# Patient Record
Sex: FEMALE | Race: WHITE | NOT HISPANIC OR LATINO | Employment: FULL TIME | URBAN - METROPOLITAN AREA
[De-identification: names, ages, dates, MRNs, and addresses within clinical notes are randomized per-mention and may not be internally consistent; named-entity substitution may affect disease eponyms.]

---

## 2020-06-30 ENCOUNTER — TELEPHONE (OUTPATIENT)
Dept: INTERNAL MEDICINE CLINIC | Facility: CLINIC | Age: 25
End: 2020-06-30

## 2020-07-01 ENCOUNTER — OFFICE VISIT (OUTPATIENT)
Dept: INTERNAL MEDICINE CLINIC | Facility: CLINIC | Age: 25
End: 2020-07-01
Payer: COMMERCIAL

## 2020-07-01 VITALS
HEIGHT: 64 IN | TEMPERATURE: 99.4 F | RESPIRATION RATE: 12 BRPM | HEART RATE: 64 BPM | DIASTOLIC BLOOD PRESSURE: 58 MMHG | SYSTOLIC BLOOD PRESSURE: 96 MMHG | BODY MASS INDEX: 21.07 KG/M2 | WEIGHT: 123.4 LBS

## 2020-07-01 DIAGNOSIS — Z3A.11 11 WEEKS GESTATION OF PREGNANCY: ICD-10-CM

## 2020-07-01 DIAGNOSIS — Z00.00 ROUTINE PHYSICAL EXAMINATION: Primary | ICD-10-CM

## 2020-07-01 DIAGNOSIS — Z00.00 ANNUAL PHYSICAL EXAM: ICD-10-CM

## 2020-07-01 PROCEDURE — 3008F BODY MASS INDEX DOCD: CPT | Performed by: NURSE PRACTITIONER

## 2020-07-01 PROCEDURE — 1036F TOBACCO NON-USER: CPT | Performed by: NURSE PRACTITIONER

## 2020-07-01 PROCEDURE — 99385 PREV VISIT NEW AGE 18-39: CPT | Performed by: NURSE PRACTITIONER

## 2020-07-01 RX ORDER — SWAB
1 SWAB, NON-MEDICATED MISCELLANEOUS DAILY
COMMUNITY
End: 2021-02-10

## 2020-07-01 NOTE — PROGRESS NOTES
ADULT ANNUAL PHYSICAL  [address] [facility]    NAME: Jayjay Anne  AGE: 25 y o  SEX: female  : 1995     DATE: 2020     Assessment and Plan:     Problem List Items Addressed This Visit        Other    11 weeks gestation of pregnancy    Relevant Orders    Ambulatory referral to Obstetrics / Gynecology      Other Visit Diagnoses     Routine physical examination    -  Primary          Immunizations and preventive care screenings were discussed with patient today  Appropriate education was printed on patient's after visit summary  Counseling:  Alcohol/drug use: discussed moderation in alcohol intake, the recommendations for healthy alcohol use, and avoidance of illicit drug use  Dental Health: discussed importance of regular tooth brushing, flossing, and dental visits  Injury prevention: discussed safety/seat belts, safety helmets, smoke detectors, carbon dioxide detectors, and smoking near bedding or upholstery  Sexual health: discussed sexually transmitted diseases, partner selection, use of condoms, avoidance of unintended pregnancy, and contraceptive alternatives  · Exercise: the importance of regular exercise/physical activity was discussed  Recommend exercise 3-5 times per week for at least 30 minutes  No follow-ups on file  Chief Complaint:     Chief Complaint   Patient presents with    Establish Care      History of Present Illness:   25year old female here to establish care  Patient states her last PCP was her pediatrician  Last medical care was approximately 5 years ago with her OBGYN when she had her son  Denies any significant pmh  Had surgery of eye ducts as a child  Family history of SVT, A-fib, leukemia  No current medications other than prenatal vitamins  Patient here for visit with zachery  No current complaints other than pregnancy related nausea  Still able to eat and drink appropriately   She is having some intolerances to dairy recently  Advised to try soy or lactose free products  Was seen in the ER at the beginning of the month for UTI  Finished course of Keflex and is asymptomatic currently  Patient states during her last pregnancy she had a yeast infection which became severe and doctors debated if she could have a vaginal delivery  No recent blood work on file  Last results from 2018  Will delay blood work at this time until she completes prenatal blood work  Patient is currently seen by [facility] for her obstetric care and [facility]  Referral was provided today  Patient was informed that if she switches practices she would be delivering at the [address] [place] [address]  If need be she can always go to [facility] which is closer but they will only have her history from the computer system  Adult Annual Physical   Patient here for a comprehensive physical exam  The patient reports problems - nausea related to pregnancy  Diet and Physical Activity  · Diet/Nutrition: well balanced diet and having some intolerances to lactose recently  · Exercise: walking  Depression Screening  PHQ-9 Depression Screening    PHQ-9:    Frequency of the following problems over the past two weeks:       Little interest or pleasure in doing things:  0 - not at all  Feeling down, depressed, or hopeless:  0 - not at all  PHQ-2 Score:  0       General Health  · Sleep: sleeps well  · Hearing: normal - bilateral   · Vision: vision problems: patient states "lazy eyes" bilaterally, has glasses but rarely wears them      · Dental: no dental visits for >1 year and brushes teeth once daily  /GYN Health  · Last menstrual period: 01/09/2020  · Contraceptive method: none currently  · History of STDs?: no      Review of Systems:     Review of Systems   Constitutional: Negative for chills, fatigue and fever  HENT: Negative for congestion, dental problem and sore throat      Eyes: Negative for photophobia and visual disturbance  Respiratory: Negative for cough, chest tightness and shortness of breath  Cardiovascular: Negative for chest pain, palpitations and leg swelling  Gastrointestinal: Positive for nausea  Negative for constipation and diarrhea  Genitourinary: Negative for dysuria, flank pain and frequency  Musculoskeletal: Negative  Neurological: Negative  Psychiatric/Behavioral: Negative  Past Medical History:     History reviewed  No pertinent past medical history  Past Surgical History:     Past Surgical History:   Procedure Laterality Date    TEAR DUCT SURGERY  1998      Social History:     E-Cigarette/Vaping    E-Cigarette Use Never User      E-Cigarette/Vaping Substances    Nicotine No     THC No     CBD No     Flavoring No     Other No     Unknown No      Social History     Socioeconomic History    Marital status:      Spouse name: None    Number of children: None    Years of education: None    Highest education level: None   Occupational History    None   Social Needs    Financial resource strain: None    Food insecurity:     Worry: None     Inability: None    Transportation needs:     Medical: None     Non-medical: None   Tobacco Use    Smoking status: Never Smoker    Smokeless tobacco: Never Used   Substance and Sexual Activity    Alcohol use: Not Currently    Drug use: Never    Sexual activity: Yes   Lifestyle    Physical activity:     Days per week: 0 days     Minutes per session: 0 min    Stress:  To some extent   Relationships    Social connections:     Talks on phone: None     Gets together: None     Attends Zoroastrianism service: None     Active member of club or organization: None     Attends meetings of clubs or organizations: None     Relationship status: None    Intimate partner violence:     Fear of current or ex partner: None     Emotionally abused: None     Physically abused: None     Forced sexual activity: None Other Topics Concern    None   Social History Narrative    None      Family History:     Family History   Problem Relation Age of Onset    Supraventricular tachycardia Mother     Ulcerative colitis Mother     Atrial fibrillation Father     Leukemia Paternal Grandmother       Current Medications:     Current Outpatient Medications   Medication Sig Dispense Refill    Prenatal Vit-Fe Fumarate-FA (PRENATAL MULTIVITAMIN) 28-0 8 MG TABS Take 1 tablet by mouth daily       No current facility-administered medications for this visit  Allergies:     No Known Allergies   Physical Exam:     BP 96/58 (BP Location: Left arm, Patient Position: Sitting)   Pulse 64   Temp 99 4 °F (37 4 °C) (Tympanic)   Resp 12   Ht 5' 4" (1 626 m)   Wt 56 kg (123 lb 6 4 oz)   BMI 21 18 kg/m²     Physical Exam   Constitutional: She is oriented to person, place, and time  She appears well-developed and well-nourished  No distress  HENT:   Head: Normocephalic and atraumatic  Right Ear: Hearing, tympanic membrane, external ear and ear canal normal    Left Ear: Hearing, tympanic membrane, external ear and ear canal normal    Nose: Nose normal  No mucosal edema or rhinorrhea  Mouth/Throat: Uvula is midline, oropharynx is clear and moist and mucous membranes are normal  No dental caries  No oropharyngeal exudate  Eyes: Pupils are equal, round, and reactive to light  Conjunctivae, EOM and lids are normal  Right eye exhibits no discharge  Left eye exhibits no discharge  Neck: Trachea normal, normal range of motion and phonation normal  Neck supple  No tracheal deviation present  No thyromegaly present  Cardiovascular: Normal rate, regular rhythm, normal heart sounds, intact distal pulses and normal pulses  No murmur heard  Pulmonary/Chest: Effort normal and breath sounds normal  No respiratory distress  She has no wheezes  Musculoskeletal: Normal range of motion  She exhibits no edema or tenderness     Lymphadenopathy: She has no cervical adenopathy  Neurological: She is alert and oriented to person, place, and time  No sensory deficit  Skin: Skin is warm, dry and intact  Capillary refill takes less than 2 seconds  No rash noted  She is not diaphoretic  Psychiatric: She has a normal mood and affect   Her speech is normal and behavior is normal  Judgment and thought content normal  Cognition and memory are normal        Nohemy Arriaga, [address] [facility]

## 2020-07-01 NOTE — PATIENT INSTRUCTIONS

## 2020-07-06 ENCOUNTER — TELEPHONE (OUTPATIENT)
Dept: INTERNAL MEDICINE CLINIC | Facility: CLINIC | Age: 25
End: 2020-07-06

## 2020-07-06 NOTE — TELEPHONE ENCOUNTER
Patient states that she has pain under her left breast for the past 4 days, she states that it hurts to breathe  She denies having any chest pain or left arm pain  She states it does hurt to breathe only because her ribs hurt  I spoke with Esthela Garces and she stated that if the breathing difficulties persist then she should go to the ER   If she can hold off then she should contact her OBGYN because she is pregnant and it can have something to do with that

## 2020-12-30 ENCOUNTER — TELEPHONE (OUTPATIENT)
Dept: INTERNAL MEDICINE CLINIC | Facility: CLINIC | Age: 25
End: 2020-12-30

## 2021-01-22 ENCOUNTER — TELEMEDICINE (OUTPATIENT)
Dept: INTERNAL MEDICINE CLINIC | Facility: CLINIC | Age: 26
End: 2021-01-22
Payer: COMMERCIAL

## 2021-01-22 DIAGNOSIS — Z23 ENCOUNTER FOR IMMUNIZATION: ICD-10-CM

## 2021-01-22 DIAGNOSIS — L29.9 PRURITUS: ICD-10-CM

## 2021-01-22 DIAGNOSIS — R21 RASH AND NONSPECIFIC SKIN ERUPTION: Primary | ICD-10-CM

## 2021-01-22 PROCEDURE — 99213 OFFICE O/P EST LOW 20 MIN: CPT | Performed by: NURSE PRACTITIONER

## 2021-01-22 PROCEDURE — 1036F TOBACCO NON-USER: CPT | Performed by: NURSE PRACTITIONER

## 2021-01-22 RX ORDER — PREDNISONE 20 MG/1
TABLET ORAL
Qty: 20 TABLET | Refills: 0 | Status: SHIPPED | OUTPATIENT
Start: 2021-01-22 | End: 2021-02-10

## 2021-01-22 NOTE — PATIENT INSTRUCTIONS
Pityriasis rosea   WHAT YOU NEED TO KNOW:   What is Pityriasis rosea? Pityriasis rosea is a skin disorder that causes a scaly rash  The cause of Pityriasis rosea is not known  It usually goes away on its own in 2 to 12 weeks  Pityriasis rosea most often occurs in people who are 8to [address] old and during pregnancy  What are the signs and symptoms of Pityriasis rosea? The rash first appears as a single pink patch on the chest or back  In people who have dark skin, the color may be violet or dark gray  Within 90 days of the first patch, the rash spreads to the rest of the torso  The rash can also spread to the neck, arms, and legs  Some people with Pityriasis rosea have mild to moderate itching  How is Pityriasis rosea diagnosed? Your healthcare provider will examine your rash and ask about your symptoms  He may take a sample of your skin to check for a fungal infection  How is Pityriasis rosea treated? Your healthcare provider may prescribe antihistamines or steroids to help reduce itching  They may be given as a pill or cream  Severe cases may be treated with ultraviolet light therapy  How can I manage my symptoms? Heat may irritate your skin and cause itching  Avoid hot showers and physical activity that may make your skin too warm  When should I contact my healthcare provider? · Your rash does not improve within 10 weeks  · Your itching becomes worse  · Your rash becomes more red and you have fever  CARE AGREEMENT:   You have the right to help plan your care  Learn about your health condition and how it may be treated  Discuss treatment options with your healthcare providers to decide what care you want to receive  You always have the right to refuse treatment  The above information is an  only  It is not intended as medical advice for individual conditions or treatments   Talk to your doctor, nurse or pharmacist before following any medical regimen to see if it is safe and effective for you  © Copyright [address] Information is for End User's use only and may not be sold, redistributed or otherwise used for commercial purposes   All illustrations and images included in CareNotes® are the copyrighted property of A D A M , Inc  or [address] [place], [place] [address]ryder emily

## 2021-01-22 NOTE — PROGRESS NOTES
Virtual Regular Visit      Assessment/Plan:  Patient will begin prednisone taper  Advised to try half a tablet of hydroxyzine if this is too sedating  Advised that if there is no improvement with steroid taper would recommend follow up with derm  No identified triggers to be causing this rash  Perhaps pityriasis rosea with pruritis? Problem List Items Addressed This Visit     None      Visit Diagnoses     Rash and nonspecific skin eruption    -  Primary    Relevant Medications    predniSONE 20 mg tablet    Pruritus        Relevant Medications    predniSONE 20 mg tablet               Reason for visit is   Chief Complaint   Patient presents with    Virtual Regular Visit        Encounter provider PAOLA Aguiar    Provider located at [address] [place] [address]      Recent Visits  No visits were found meeting these conditions  Showing recent visits within past 7 days and meeting all other requirements     Today's Visits  Date Type Provider Dept   01/22/21 [facility] today's visits and meeting all other requirements     Future Appointments  No visits were found meeting these conditions  Showing future appointments within next 150 days and meeting all other requirements        The patient was identified by name and date of birth  Kenneth Patient was informed that this is a telemedicine visit and that the visit is being conducted through [facility] and patient was informed that this is not a secure, HIPAA-compliant platform  She agrees to proceed     My office door was closed  No one else was in the room  She acknowledged consent and understanding of privacy and security of the video platform  The patient has agreed to participate and understands they can discontinue the visit at any time  Patient is aware this is a billable service       Subjective  Kenneth Patient is a 22 y o  female rash and itching for over one month   Patient has complaints of a rash on her back, torso, and chest which has been occurring intermittently for at least one month  She was seen at urgent care and given medrol dose pack and hydroxyzine  States when she takes the hydroxyzine this makes her fall asleep, but does calm down the rash  She also has been taking benadryl as needed  States the rash seems to get worse in the evening  She denies changes to soap, body wash, shampoo, conditions, make up products, laundry detergents, lotions  No new foods  No recent move, has been in the same house  Does not have any pets  Has not had a rash on arms, legs, hands, feet, or face  No new medications  Has been on her birth control since October at least a month or so before the rash started  No one else in the household is experiencing rash symptoms  No past medical history on file  Past Surgical History:   Procedure Laterality Date    TEAR DUCT SURGERY  1998       Current Outpatient Medications   Medication Sig Dispense Refill    famotidine (PEPCID) 40 MG tablet Take 40 mg by mouth daily      hydrOXYzine HCL (ATARAX) 25 mg tablet Take 25 mg by mouth every 6 (six) hours as needed      ibuprofen (MOTRIN) 600 mg tablet TAKE 1 TABLET BY MOUTH EVERY 6 HOURS AS NEEDED FOR MILD PAIN (PAIN SCORE 1 3)      Junel FE 1/20 1-20 MG-MCG per tablet Take 1 tablet by mouth daily      predniSONE 20 mg tablet Take 3 tabs for three days, take 2 tabs for three days, take 1 tab for three days, take one half of a tab for four days then stop  20 tablet 0    Prenatal Vit-Fe Fumarate-FA (PRENATAL MULTIVITAMIN) 28-0 8 MG TABS Take 1 tablet by mouth daily       No current facility-administered medications for this visit  No Known Allergies    Review of Systems   Constitutional: Negative for chills, fatigue and fever  HENT: Negative for congestion, rhinorrhea and sneezing      Respiratory: Negative for cough, chest tightness, shortness of breath and wheezing  Gastrointestinal: Positive for nausea (for three days but then resolved)  Negative for abdominal pain  Musculoskeletal: Negative for myalgias  Skin: Positive for rash  Allergic/Immunologic: Negative for environmental allergies and food allergies  Neurological: Negative for dizziness and headaches  Psychiatric/Behavioral: Positive for sleep disturbance  Video Exam    There were no vitals filed for this visit  Physical Exam  Constitutional:       General: She is not in acute distress  Appearance: Normal appearance  She is well-developed, well-groomed and normal weight  She is not ill-appearing  HENT:      Head: Normocephalic and atraumatic  Right Ear: Hearing normal       Left Ear: Hearing normal       Nose: Nose normal    Pulmonary:      Effort: No tachypnea or respiratory distress  Skin:     Findings: No rash  Comments: No rash currently     Neurological:      Mental Status: She is alert and oriented to person, place, and time  Psychiatric:         Attention and Perception: Attention normal          Mood and Affect: Mood normal          Speech: Speech normal          Behavior: Behavior normal  Behavior is cooperative  Thought Content: Thought content normal           I spent 15 minutes directly with the patient during this visit      VIRTUAL VISIT DISCLAIMER    Carmen Birmingham acknowledges that she has consented to an online visit or consultation  She understands that the online visit is based solely on information provided by her, and that, in the absence of a face-to-face physical evaluation by the physician, the diagnosis she receives is both limited and provisional in terms of accuracy and completeness  This is not intended to replace a full medical face-to-face evaluation by the physician  Carmen Birmingham understands and accepts these terms

## 2021-02-10 ENCOUNTER — TELEMEDICINE (OUTPATIENT)
Dept: INTERNAL MEDICINE CLINIC | Facility: CLINIC | Age: 26
End: 2021-02-10
Payer: COMMERCIAL

## 2021-02-10 DIAGNOSIS — F32.A ANXIETY AND DEPRESSION: Primary | ICD-10-CM

## 2021-02-10 DIAGNOSIS — L29.9 PRURITUS: ICD-10-CM

## 2021-02-10 DIAGNOSIS — R21 RASH AND NONSPECIFIC SKIN ERUPTION: ICD-10-CM

## 2021-02-10 DIAGNOSIS — F41.9 ANXIETY AND DEPRESSION: Primary | ICD-10-CM

## 2021-02-10 PROBLEM — Z3A.11 11 WEEKS GESTATION OF PREGNANCY: Status: RESOLVED | Noted: 2020-04-06 | Resolved: 2021-02-10

## 2021-02-10 PROCEDURE — 3725F SCREEN DEPRESSION PERFORMED: CPT | Performed by: NURSE PRACTITIONER

## 2021-02-10 PROCEDURE — 99213 OFFICE O/P EST LOW 20 MIN: CPT | Performed by: NURSE PRACTITIONER

## 2021-02-10 PROCEDURE — 1036F TOBACCO NON-USER: CPT | Performed by: NURSE PRACTITIONER

## 2021-02-10 RX ORDER — ESCITALOPRAM OXALATE 10 MG/1
10 TABLET ORAL DAILY
Qty: 30 TABLET | Refills: 5 | Status: SHIPPED | OUTPATIENT
Start: 2021-02-10 | End: 2021-09-21 | Stop reason: SDUPTHER

## 2021-02-10 NOTE — PROGRESS NOTES
Virtual Regular Visit      Assessment/Plan:    Problem List Items Addressed This Visit        Musculoskeletal and Integument    Rash and nonspecific skin eruption     Patient advised to follow up with dermatology  If unable to get appt with [facility] can try dedicated dermatology  Relevant Orders    Ambulatory referral to Dermatology       Other    Anxiety and depression - Primary     Patient scored 11 on PHQ9 test  Was on Lexapro in the past  Will trial restarting at same dosage  Advised on taking routinely at the same time every day  May cause GI upset initially  Beneficial effects may take 4-6 weeks  Patient to follow up in 6 weeks time  Relevant Medications    escitalopram (LEXAPRO) 10 mg tablet      Other Visit Diagnoses     Pruritus        Relevant Orders    Ambulatory referral to Dermatology               Reason for visit is   Chief Complaint   Patient presents with    Virtual Regular Visit        Encounter provider Triston Kelsey Casia     Provider located at [address] [place] [address]      Recent Visits  No visits were found meeting these conditions  Showing recent visits within past 7 days and meeting all other requirements     Today's Visits  Date Type Provider Dept   02/10/21 Telemedicine Jeny Durand, [facility] today's visits and meeting all other requirements     Future Appointments  No visits were found meeting these conditions  Showing future appointments within next 150 days and meeting all other requirements        The patient was identified by name and date of birth  Kaela Vaughn was informed that this is a telemedicine visit and that the visit is being conducted through [address] [place] [address] and patient was informed that this is not a secure, HIPAA-compliant platform  She agrees to proceed     My office door was closed  No one else was in the room    She acknowledged consent and understanding of privacy and security of the video platform  The patient has agreed to participate and understands they can discontinue the visit at any time  Patient is aware this is a billable service  Subjective  Jamie Hugo is a 22 y o  female depression   Patient has concerns of depression  States that some days she does not want to get out of bed  Most days she spends half of her time laying on the couch  She is enrolled in college but this is virtual from home  She has a 12 yo son who is autistic and has ADHD which adds to her stress  States he is not yet in   He does have a behavioral health specialists but visits are only virtual  Patient states prior to her son she was on lexapro 10 mg which was helpful  Also states that some friends or family have told her that she gets very mean at times  Continues to have complaints of a skin rash that comes and goes and is accompanied by itching  States that sometimes she just itches and there is no visible rash  She has denied changes to soaps, lotions, detergents, food, or clothes  She was taking atarax which helps with itching but causes her to be drowsy  She was given a prednisone taper and states this did not help much  She has not seen a dermatologist         No past medical history on file      Past Surgical History:   Procedure Laterality Date    TEAR DUCT SURGERY  1998       Current Outpatient Medications   Medication Sig Dispense Refill    escitalopram (LEXAPRO) 10 mg tablet Take 1 tablet (10 mg total) by mouth daily 30 tablet 5    famotidine (PEPCID) 40 MG tablet Take 40 mg by mouth daily      hydrOXYzine HCL (ATARAX) 25 mg tablet Take 25 mg by mouth every 6 (six) hours as needed      ibuprofen (MOTRIN) 600 mg tablet TAKE 1 TABLET BY MOUTH EVERY 6 HOURS AS NEEDED FOR MILD PAIN (PAIN SCORE 1 3)      Junel FE 1/20 1-20 MG-MCG per tablet Take 1 tablet by mouth daily       No current facility-administered medications for this visit  No Known Allergies    Review of Systems   Constitutional: Positive for fatigue  Negative for chills  Skin: Positive for rash  Negative for wound  Itching     Neurological: Negative for dizziness and headaches  Psychiatric/Behavioral: Positive for dysphoric mood and sleep disturbance (sleeps more than normal)  The patient is nervous/anxious  Video Exam    There were no vitals filed for this visit  Physical Exam  Constitutional:       General: She is not in acute distress  Appearance: Normal appearance  She is well-developed, well-groomed and normal weight  She is not ill-appearing  HENT:      Head: Normocephalic and atraumatic  Right Ear: Hearing normal       Left Ear: Hearing normal       Nose: Nose normal    Pulmonary:      Effort: Pulmonary effort is normal  No tachypnea or respiratory distress  Skin:     Findings: Rash present  Comments: Patient reports intermittent rash, not present currenlty     Neurological:      Mental Status: She is alert and oriented to person, place, and time  Psychiatric:         Attention and Perception: Attention normal          Mood and Affect: Mood normal          Speech: Speech normal          Behavior: Behavior normal  Behavior is cooperative  Thought Content:  Thought content normal          Cognition and Memory: Cognition normal          Judgment: Judgment normal         PHQ-9 Depression Screening    PHQ-9:   Frequency of the following problems over the past two weeks:      Little interest or pleasure in doing things: 1 - several days  Feeling down, depressed, or hopeless: 2 - more than half the days  Trouble falling or staying asleep, or sleeping too much: 3 - nearly every day  Feeling tired or having little energy: 3 - nearly every day  Poor appetite or overeatin - not at all  Feeling bad about yourself - or that you are a failure or have let yourself or your family down: 0 - not at all  Trouble concentrating on things, such as reading the newspaper or watching television: 1 - several days  Moving or speaking so slowly that other people could have noticed  Or the opposite - being so fidgety or restless that you have been moving around a lot more than usual: 1 - several days  Thoughts that you would be better off dead, or of hurting yourself in some way: 0 - not at all  PHQ-2 Score: 3  PHQ-9 Score: 11         I spent 15 minutes directly with the patient during this visit      VIRTUAL VISIT DISCLAIMER    Jocelin Vieira acknowledges that she has consented to an online visit or consultation  She understands that the online visit is based solely on information provided by her, and that, in the absence of a face-to-face physical evaluation by the physician, the diagnosis she receives is both limited and provisional in terms of accuracy and completeness  This is not intended to replace a full medical face-to-face evaluation by the physician  Jocelin Vieira understands and accepts these terms

## 2021-02-10 NOTE — ASSESSMENT & PLAN NOTE
Patient scored 11 on PHQ9 test  Was on Lexapro in the past  Will trial restarting at same dosage  Advised on taking routinely at the same time every day  May cause GI upset initially  Beneficial effects may take 4-6 weeks  Patient to follow up in 6 weeks time

## 2021-02-10 NOTE — ASSESSMENT & PLAN NOTE
Patient advised to follow up with dermatology  If unable to get appt with [facility] can try dedicated dermatology

## 2021-02-13 ENCOUNTER — IMMUNIZATIONS (OUTPATIENT)
Dept: FAMILY MEDICINE CLINIC | Facility: HOSPITAL | Age: 26
End: 2021-02-13

## 2021-02-13 DIAGNOSIS — Z23 ENCOUNTER FOR IMMUNIZATION: Primary | ICD-10-CM

## 2021-02-13 PROCEDURE — 91301 SARS-COV-2 / COVID-19 MRNA VACCINE (MODERNA) 100 MCG: CPT

## 2021-02-13 PROCEDURE — 0011A SARS-COV-2 / COVID-19 MRNA VACCINE (MODERNA) 100 MCG: CPT

## 2021-02-18 DIAGNOSIS — L29.9 PRURITUS: ICD-10-CM

## 2021-02-18 DIAGNOSIS — R21 RASH AND NONSPECIFIC SKIN ERUPTION: Primary | ICD-10-CM

## 2021-02-18 RX ORDER — HYDROXYZINE HYDROCHLORIDE 25 MG/1
25 TABLET, FILM COATED ORAL EVERY 6 HOURS PRN
Qty: 30 TABLET | Refills: 0 | Status: SHIPPED | OUTPATIENT
Start: 2021-02-18 | End: 2021-10-21 | Stop reason: SDUPTHER

## 2021-03-03 ENCOUNTER — HOSPITAL ENCOUNTER (EMERGENCY)
Facility: HOSPITAL | Age: 26
Discharge: HOME | End: 2021-03-03
Attending: EMERGENCY MEDICINE | Admitting: EMERGENCY MEDICINE
Payer: COMMERCIAL

## 2021-03-03 VITALS
RESPIRATION RATE: 18 BRPM | TEMPERATURE: 98.2 F | WEIGHT: 125 LBS | OXYGEN SATURATION: 98 % | HEIGHT: 64 IN | HEART RATE: 63 BPM | BODY MASS INDEX: 21.34 KG/M2

## 2021-03-03 DIAGNOSIS — L29.9 GENERALIZED PRURITUS: Primary | ICD-10-CM

## 2021-03-03 PROCEDURE — 99282 EMERGENCY DEPT VISIT SF MDM: CPT

## 2021-03-03 PROCEDURE — 99284 EMERGENCY DEPT VISIT MOD MDM: CPT | Performed by: EMERGENCY MEDICINE

## 2021-03-03 RX ORDER — TRIAMCINOLONE ACETONIDE 1 MG/G
CREAM TOPICAL 2 TIMES DAILY
Qty: 30 G | Refills: 0 | Status: SHIPPED | OUTPATIENT
Start: 2021-03-03 | End: 2021-09-21 | Stop reason: ALTCHOICE
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
## 2021-03-03 NOTE — ED NOTES
Pt here for itching x 2 mo pt in no acute distress pt sts she has follow up with derm in 1 month but this has been going on for the past 2 months pt denies change in meds or change in food or cleaning supplies      Checo Vera RN  03/03/21 4416

## 2021-03-13 ENCOUNTER — IMMUNIZATIONS (OUTPATIENT)
Dept: FAMILY MEDICINE CLINIC | Facility: HOSPITAL | Age: 26
End: 2021-03-13

## 2021-03-13 DIAGNOSIS — Z23 ENCOUNTER FOR IMMUNIZATION: Primary | ICD-10-CM

## 2021-03-13 PROCEDURE — 91301 SARS-COV-2 / COVID-19 MRNA VACCINE (MODERNA) 100 MCG: CPT

## 2021-03-13 PROCEDURE — 0012A SARS-COV-2 / COVID-19 MRNA VACCINE (MODERNA) 100 MCG: CPT

## 2021-09-21 ENCOUNTER — OFFICE VISIT (OUTPATIENT)
Dept: INTERNAL MEDICINE CLINIC | Facility: CLINIC | Age: 26
End: 2021-09-21
Payer: COMMERCIAL

## 2021-09-21 VITALS
RESPIRATION RATE: 15 BRPM | SYSTOLIC BLOOD PRESSURE: 99 MMHG | BODY MASS INDEX: 21.44 KG/M2 | OXYGEN SATURATION: 98 % | TEMPERATURE: 97.6 F | HEIGHT: 64 IN | HEART RATE: 61 BPM | WEIGHT: 125.6 LBS | DIASTOLIC BLOOD PRESSURE: 67 MMHG

## 2021-09-21 DIAGNOSIS — F32.A ANXIETY AND DEPRESSION: ICD-10-CM

## 2021-09-21 DIAGNOSIS — F41.9 ANXIETY AND DEPRESSION: ICD-10-CM

## 2021-09-21 DIAGNOSIS — R45.4 DIFFICULTY CONTROLLING ANGER: ICD-10-CM

## 2021-09-21 DIAGNOSIS — F99 MENTAL HEALTH DISORDER: ICD-10-CM

## 2021-09-21 DIAGNOSIS — Z23 ENCOUNTER FOR VACCINATION: Primary | ICD-10-CM

## 2021-09-21 PROCEDURE — 90686 IIV4 VACC NO PRSV 0.5 ML IM: CPT | Performed by: NURSE PRACTITIONER

## 2021-09-21 PROCEDURE — 99213 OFFICE O/P EST LOW 20 MIN: CPT | Performed by: NURSE PRACTITIONER

## 2021-09-21 PROCEDURE — 1036F TOBACCO NON-USER: CPT | Performed by: NURSE PRACTITIONER

## 2021-09-21 PROCEDURE — 3008F BODY MASS INDEX DOCD: CPT | Performed by: NURSE PRACTITIONER

## 2021-09-21 PROCEDURE — 90471 IMMUNIZATION ADMIN: CPT | Performed by: NURSE PRACTITIONER

## 2021-09-21 RX ORDER — ESCITALOPRAM OXALATE 20 MG/1
20 TABLET ORAL DAILY
Qty: 90 TABLET | Refills: 0 | Status: SHIPPED | OUTPATIENT
Start: 2021-09-21

## 2021-09-21 NOTE — ASSESSMENT & PLAN NOTE
The patient continues on her Lexapro 10 mg a day  Patient states that she has not seen much benefit from the Lexapro  We will increase this to 20 mg
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
## 2021-09-21 NOTE — PATIENT INSTRUCTIONS
Anxiety   WHAT YOU NEED TO KNOW:   Anxiety is a condition that causes you to feel extremely worried or nervous  The feelings are so strong that they can cause problems with your daily activities or sleep  Anxiety may be triggered by something you fear, or it may happen without a cause  Family or work stress, smoking, caffeine, and alcohol can increase your risk for anxiety  Certain medicines or health conditions can also increase your risk  Anxiety can become a long-term condition if it is not managed or treated  DISCHARGE INSTRUCTIONS:   Call your local emergency number (911 in the [address]) if:   · You have chest pain, tightness, or heaviness that may spread to your shoulders, arms, jaw, neck, or back  · You feel like hurting yourself or someone else  Call your doctor if:   · Your symptoms get worse or do not get better with treatment  · Your anxiety keeps you from doing your regular daily activities  · You have new symptoms since your last visit  · You have questions or concerns about your condition or care  Medicines:   · Medicines  may be given to help you feel more calm and relaxed, and decrease your symptoms  · Take your medicine as directed  Contact your healthcare provider if you think your medicine is not helping or if you have side effects  Tell him of her if you are allergic to any medicine  Keep a list of the medicines, vitamins, and herbs you take  Include the amounts, and when and why you take them  Bring the list or the pill bottles to follow-up visits  Carry your medicine list with you in case of an emergency  Manage anxiety:   · Talk to someone about your anxiety  Your healthcare provider may suggest counseling  Cognitive behavioral therapy can help you understand and change how you react to events that trigger your symptoms  You might feel more comfortable talking with a friend or family member about your anxiety   Choose someone you know will be supportive and encouraging  · Find ways to relax  Activities such as exercise, meditation, or listening to music can help you relax  Spend time with friends, or do things you enjoy  · Practice deep breathing  Deep breathing can help you relax when you feel anxious  Focus on taking slow, deep breaths several times a day, or during an anxiety attack  Breathe in through your nose and out through your mouth  · Create a regular sleep routine  Regular sleep can help you feel calmer during the day  Go to sleep and wake up at the same times every day  Do not watch television or use the computer right before bed  Your room should be comfortable, dark, and quiet  · Eat a variety of healthy foods  Healthy foods include fruits, vegetables, low-fat dairy products, lean meats, fish, whole-grain breads, and cooked beans  Healthy foods can help you feel less anxious and have more energy  · Exercise regularly  Exercise can increase your energy level  Exercise may also lift your mood and help you sleep better  Your healthcare provider can help you create an exercise plan  · Do not smoke  Nicotine and other chemicals in cigarettes and cigars can increase anxiety  Ask your healthcare provider for information if you currently smoke and need help to quit  E-cigarettes or smokeless tobacco still contain nicotine  Talk to your healthcare provider before you use these products  · Do not have caffeine  Caffeine can make your symptoms worse  Do not have foods or drinks that are meant to increase your energy level  · Limit or do not drink alcohol  Ask your healthcare provider if alcohol is safe for you  You may not be able to drink alcohol if you take certain anxiety or depression medicines  Limit alcohol to 1 drink per day if you are a woman  Limit alcohol to 2 drinks per day if you are a man  A drink of alcohol is 12 ounces of beer, 5 ounces of wine, or 1½ ounces of liquor  · Do not use drugs    Drugs can make your anxiety worse  It can also make anxiety hard to manage  Talk to your healthcare provider if you use drugs and want help to quit  Follow up with your doctor within 2 weeks or as directed:  Write down your questions so you remember to ask them during your visits  © Copyright [employer] 2021 Information is for End User's use only and may not be sold, redistributed or otherwise used for commercial purposes  All illustrations and images included in CareNotes® are the copyrighted property of [employer]  or Irina Gonzalez   The above information is an  only  It is not intended as medical advice for individual conditions or treatments  Talk to your doctor, nurse or pharmacist before following any medical regimen to see if it is safe and effective for you
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
## 2021-09-23 ENCOUNTER — TELEPHONE (OUTPATIENT)
Dept: INTERNAL MEDICINE CLINIC | Facility: CLINIC | Age: 26
End: 2021-09-23

## 2021-10-21 ENCOUNTER — OFFICE VISIT (OUTPATIENT)
Dept: INTERNAL MEDICINE CLINIC | Facility: CLINIC | Age: 26
End: 2021-10-21
Payer: COMMERCIAL

## 2021-10-21 VITALS
WEIGHT: 125 LBS | OXYGEN SATURATION: 99 % | DIASTOLIC BLOOD PRESSURE: 80 MMHG | RESPIRATION RATE: 16 BRPM | TEMPERATURE: 97.9 F | BODY MASS INDEX: 21.34 KG/M2 | HEIGHT: 64 IN | HEART RATE: 61 BPM | SYSTOLIC BLOOD PRESSURE: 98 MMHG

## 2021-10-21 DIAGNOSIS — F41.9 ANXIETY AND DEPRESSION: ICD-10-CM

## 2021-10-21 DIAGNOSIS — F33.9 DEPRESSION, RECURRENT (HCC): ICD-10-CM

## 2021-10-21 DIAGNOSIS — L29.9 PRURITUS: ICD-10-CM

## 2021-10-21 DIAGNOSIS — F32.A ANXIETY AND DEPRESSION: ICD-10-CM

## 2021-10-21 DIAGNOSIS — R45.4 DIFFICULTY CONTROLLING ANGER: Primary | ICD-10-CM

## 2021-10-21 DIAGNOSIS — R21 RASH AND NONSPECIFIC SKIN ERUPTION: ICD-10-CM

## 2021-10-21 PROCEDURE — 1036F TOBACCO NON-USER: CPT | Performed by: NURSE PRACTITIONER

## 2021-10-21 PROCEDURE — 3008F BODY MASS INDEX DOCD: CPT | Performed by: NURSE PRACTITIONER

## 2021-10-21 PROCEDURE — 99213 OFFICE O/P EST LOW 20 MIN: CPT | Performed by: NURSE PRACTITIONER

## 2021-10-21 RX ORDER — ARIPIPRAZOLE 10 MG/1
10 TABLET ORAL DAILY
Qty: 30 TABLET | Refills: 1 | Status: SHIPPED | OUTPATIENT
Start: 2021-10-21 | End: 2021-11-18 | Stop reason: SDUPTHER

## 2021-10-21 RX ORDER — HYDROXYZINE HYDROCHLORIDE 25 MG/1
25 TABLET, FILM COATED ORAL EVERY 6 HOURS PRN
Qty: 30 TABLET | Refills: 5 | Status: SHIPPED | OUTPATIENT
Start: 2021-10-21

## 2022-05-03 DIAGNOSIS — R45.4 DIFFICULTY CONTROLLING ANGER: ICD-10-CM

## 2022-05-03 DIAGNOSIS — R21 RASH AND NONSPECIFIC SKIN ERUPTION: ICD-10-CM

## 2022-05-03 DIAGNOSIS — L29.9 PRURITUS: ICD-10-CM

## 2022-05-03 RX ORDER — ARIPIPRAZOLE 10 MG/1
10 TABLET ORAL DAILY
Qty: 30 TABLET | Refills: 0 | Status: CANCELLED | OUTPATIENT
Start: 2022-05-03

## 2022-05-03 RX ORDER — HYDROXYZINE HYDROCHLORIDE 25 MG/1
25 TABLET, FILM COATED ORAL EVERY 6 HOURS PRN
Qty: 30 TABLET | Refills: 0 | Status: CANCELLED | OUTPATIENT
Start: 2022-05-03

## 2022-05-04 RX ORDER — ARIPIPRAZOLE 10 MG/1
TABLET ORAL
Qty: 30 TABLET | Refills: 0 | Status: SHIPPED | OUTPATIENT
Start: 2022-05-04

## 2022-05-04 NOTE — TELEPHONE ENCOUNTER
Patient needs follow up appointment in the office  Was supposed to see Hanane Patel back in November

## 2022-11-01 ENCOUNTER — TELEPHONE (OUTPATIENT)
Dept: ADMINISTRATIVE | Facility: OTHER | Age: 27
End: 2022-11-01

## 2022-11-01 NOTE — TELEPHONE ENCOUNTER
----- Message from Payton Sanchez sent at 11/1/2022 10:47 AM EDT -----  11/01/22 10:47 AM    Hello, our patient Michael Acuña has had Pap Smear (HPV) aka Cervical Cancer Screening completed/performed  Please assist in updating the patient chart by making an External outreach to [address] [place] [address] located in [address]   The date of service is 08/17/2022 and 09/01/2022 and 09/06/2022  Can you verify which date was a possible Cervical Cancer Screening completed or if she doesn't qualify at this time for the procedure  The physicians were Rere Reynoso and [place], [address] [facility] [address]    Thank you,  [address]

## 2022-11-01 NOTE — TELEPHONE ENCOUNTER
Upon review of the In Basket request we were able to locate, review, and update the patient chart as requested for Pap Smear (HPV) aka Cervical Cancer Screening  Any additional questions or concerns should be emailed to the Practice Liaisons via the appropriate education email address, please do not reply via In Basket      Thank you  Homar Hamlin

## 2022-11-02 ENCOUNTER — OFFICE VISIT (OUTPATIENT)
Dept: INTERNAL MEDICINE CLINIC | Facility: CLINIC | Age: 27
End: 2022-11-02

## 2022-11-02 ENCOUNTER — TELEPHONE (OUTPATIENT)
Dept: OTHER | Facility: OTHER | Age: 27
End: 2022-11-02

## 2022-11-02 VITALS
OXYGEN SATURATION: 98 % | SYSTOLIC BLOOD PRESSURE: 128 MMHG | BODY MASS INDEX: 22.11 KG/M2 | TEMPERATURE: 97 F | DIASTOLIC BLOOD PRESSURE: 68 MMHG | WEIGHT: 128.8 LBS | RESPIRATION RATE: 18 BRPM | HEART RATE: 71 BPM

## 2022-11-02 DIAGNOSIS — D50.9 IRON DEFICIENCY ANEMIA, UNSPECIFIED IRON DEFICIENCY ANEMIA TYPE: ICD-10-CM

## 2022-11-02 DIAGNOSIS — L29.9 PRURITUS: ICD-10-CM

## 2022-11-02 DIAGNOSIS — F41.9 ANXIETY AND DEPRESSION: ICD-10-CM

## 2022-11-02 DIAGNOSIS — F32.A ANXIETY AND DEPRESSION: ICD-10-CM

## 2022-11-02 DIAGNOSIS — R21 RASH AND NONSPECIFIC SKIN ERUPTION: ICD-10-CM

## 2022-11-02 DIAGNOSIS — Z23 ENCOUNTER FOR IMMUNIZATION: Primary | ICD-10-CM

## 2022-11-02 DIAGNOSIS — Z00.00 ANNUAL PHYSICAL EXAM: ICD-10-CM

## 2022-11-02 DIAGNOSIS — R45.4 DIFFICULTY CONTROLLING ANGER: ICD-10-CM

## 2022-11-02 RX ORDER — PERMETHRIN 50 MG/G
CREAM TOPICAL ONCE
Qty: 60 G | Refills: 0 | Status: SHIPPED | OUTPATIENT
Start: 2022-11-02 | End: 2022-11-02

## 2022-11-02 RX ORDER — ARIPIPRAZOLE 10 MG/1
10 TABLET ORAL DAILY
Qty: 30 TABLET | Refills: 0 | Status: SHIPPED | OUTPATIENT
Start: 2022-11-02 | End: 2022-11-02

## 2022-11-02 RX ORDER — PERMETHRIN 50 MG/G
CREAM TOPICAL
Qty: 60 G | Refills: 1 | Status: SHIPPED | OUTPATIENT
Start: 2022-11-02

## 2022-11-02 RX ORDER — ESCITALOPRAM OXALATE 20 MG/1
20 TABLET ORAL DAILY
Qty: 90 TABLET | Refills: 0 | Status: SHIPPED | OUTPATIENT
Start: 2022-11-02 | End: 2022-11-02

## 2022-11-02 RX ORDER — HYDROXYZINE HYDROCHLORIDE 25 MG/1
25 TABLET, FILM COATED ORAL EVERY 6 HOURS PRN
Qty: 30 TABLET | Refills: 5 | Status: SHIPPED | OUTPATIENT
Start: 2022-11-02

## 2022-11-02 NOTE — TELEPHONE ENCOUNTER
Patient is calling regarding rescheduling an appointment  Date/Time: 11/2/22 0800     Patient was rescheduled: YES [x] NO []    Patient requesting call back to reschedule: YES [] NO [x]      Patient states she received an e-mail that her appointment was canceled  Patient called back to reschedule  Patient is rescheduled for today at 3:20pm with Dr Morse

## 2022-11-02 NOTE — PATIENT INSTRUCTIONS

## 2022-11-03 ENCOUNTER — TELEPHONE (OUTPATIENT)
Dept: PSYCHIATRY | Facility: CLINIC | Age: 27
End: 2022-11-03

## 2022-11-03 NOTE — TELEPHONE ENCOUNTER
Was calling pt in regards to routine referral and adding to proper wait list  Phone was picked up but no contact was made

## 2022-11-07 NOTE — TELEPHONE ENCOUNTER
Was calling pt in regards to routine referral and adding pt to proper wait list  LVM for pt to contact intake dept

## 2023-01-31 ENCOUNTER — TELEPHONE (OUTPATIENT)
Dept: PSYCHIATRY | Facility: CLINIC | Age: 28
End: 2023-01-31

## 2023-01-31 NOTE — TELEPHONE ENCOUNTER
Behavioral Health Outpatient Intake Questions    Referred By  : PCP     Please advise interviewee that they need to answer all questions truthfully to allow for best care, and any misrepresentations of information may affect their ability to be seen at this clinic   => Was this discussed? Yes     If Minor Child (under age 25)    Who is/are the legal guardian(s) of the child? Is there a custody agreement? No     • If "YES"- Custody orders must be obtained prior to scheduling the first appointment  • In addition, Consent to Treatment must be signed by all legal guardians prior to scheduling the first appointment    • If "NO"- Consent to Treatment must be signed by all legal guardians prior to scheduling the first appointment    [address] History -     Presenting Problem (in patient's own words): potential bipolar disorder, Anxiety, Depression    Are there any communication barriers for this patient? No                                               If yes, please describe barriers:   • If there is a unique situation, please refer to [address] [place] [address] for final determination  Are you taking any psychiatric medications? No   •   If "YES" -What are they    •   If "YES" -Who prescribes? Has the Patient previously received outpatient Talk Therapy or Medication Management from [facility]     •    If "YES"- When, Where and with Whom? •    If "NO" -Has Patient received these services elsewhere? •   If "YES" -When, Where, and with Whom? Has the Patient abused alcohol or other substances in the last 6 months ? No  No concerns of substance abuse are reported  •  If "YES" -What substance, How much, How often? •  If illegal substance: Refer to [facility] (for [facility]) or [facility]  •  If Alcohol in excess of 10 drinks per week:  Refer to [facility] (for [place]) or [address] [place] [address] History-     Is this treatment court ordered?  No   • If "Yes"- refer to [address] [place] [address] for final determination  Has the Patient been convicted of a felony? No  •  If "Yes" -When, What? • Talk Therapy : Send to [address] [place] [address] for final determination   • Med Management: Send to Dr Phyllis Upton for final determination     ACCEPTED as a patient Yes  • If "Yes" Appointment Date: 2/27 @  Ana Lyon     Referred Elsewhere? No  • If “Yes” - (Where? Ex: [facility], [facility], [address] [place] [address], etc )       Name of Insurance Co: [address] [place] [address] ID# [id number]   Insurance Phone #  If ins is primary or secondary? If patient is a minor, parents information such as Name, D  O B of guarantor

## 2023-02-02 ENCOUNTER — DOCUMENTATION (OUTPATIENT)
Dept: BEHAVIORAL/MENTAL HEALTH CLINIC | Facility: CLINIC | Age: 28
End: 2023-02-02

## 2023-02-27 ENCOUNTER — OFFICE VISIT (OUTPATIENT)
Dept: PSYCHIATRY | Facility: CLINIC | Age: 28
End: 2023-02-27

## 2023-02-27 DIAGNOSIS — R45.4 DIFFICULTY CONTROLLING ANGER: ICD-10-CM

## 2023-02-27 DIAGNOSIS — F32.A ANXIETY AND DEPRESSION: ICD-10-CM

## 2023-02-27 DIAGNOSIS — F31.9 BIPOLAR 1 DISORDER (HCC): Primary | ICD-10-CM

## 2023-02-27 DIAGNOSIS — F41.9 ANXIETY AND DEPRESSION: ICD-10-CM

## 2023-02-27 RX ORDER — OLANZAPINE 2.5 MG/1
2.5 TABLET ORAL
Qty: 30 TABLET | Refills: 1 | Status: SHIPPED | OUTPATIENT
Start: 2023-02-27
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
## 2023-03-27 ENCOUNTER — OFFICE VISIT (OUTPATIENT)
Dept: PSYCHIATRY | Facility: CLINIC | Age: 28
End: 2023-03-27

## 2023-03-27 DIAGNOSIS — F31.9 BIPOLAR 1 DISORDER (HCC): Primary | ICD-10-CM

## 2023-03-27 RX ORDER — QUETIAPINE FUMARATE 25 MG/1
25 TABLET, FILM COATED ORAL
Qty: 30 TABLET | Refills: 0 | Status: SHIPPED | OUTPATIENT
Start: 2023-03-27

## 2023-03-27 RX ORDER — DIPHENHYDRAMINE HCL 25 MG
100 CAPSULE ORAL
COMMUNITY

## 2023-03-27 NOTE — PSYCH
Regular Visit    Problem List Items Addressed This Visit        Other    Bipolar 1 disorder ([address] ) - Primary    Relevant Medications    QUEtiapine (SEROquel) 25 mg tablet          Encounter provider PAOLA Domínguez    Provider located at    [id number] [facility]  [address] [address] [phone]    Recent Visits  No visits were found meeting these conditions  Showing recent visits within past 7 days and meeting all other requirements  Today's Visits  Date Type Provider Dept   03/27/23 Office Visit Marlyn Maddox, [address] today's visits and meeting all other requirements  Future Appointments  No visits were found meeting these conditions  Showing future appointments within next 150 days and meeting all other requirements       HPI     Current Outpatient Medications   Medication Sig Dispense Refill   • diphenhydrAMINE (BENADRYL) 25 mg capsule Take 100 mg by mouth daily at bedtime as needed for sleep     • QUEtiapine (SEROquel) 25 mg tablet Take 1 tablet (25 mg total) by mouth daily at bedtime 30 tablet 0   • permethrin (ELIMITE) 5 % cream Apply now and then again in one week (Patient not taking: Reported on 2/27/2023) 60 g 1     No current facility-administered medications for this visit  Review of Systems    I spent 20 minutes directly with the patient during this visit      [address] [place] [address]    Name and Date of Birth:  Guy Trinidad 32 y o  1995 MRN: [id number]    Date of Visit: March 27, 2023    No Known Allergies    Visit Time    Visit Start Time: 1100  Visit Stop Time: 1120  Total Visit Duration: 20 minutes    SUBJECTIVE:    Polina Gonzalez is seen today for a follow up for Bipolar Disorder  She continues to experience ongoing symptoms since the last visit  Barb Fernández was seen today for medication management    She was last seen by this provider on 2/27/23  She reports today that she is 7 weeks pregnant, and she is excited about the pregnancy  She states that she has been taking the zyprexa, but has not seen any improvement in her sleep  She continues to take approximately 100 mg of Benadryl at night in addition to the 2 5 mg of Zyprexa  She states that she will fall asleep, but wakes up within an hour and then up all night  She continues to have irritability and anger which has not decreased with the Zyprexa  She denies suicidal and homicidal ideation, denies auditory and visual hallucinations  At this time we will discontinue her Zyprexa and initiate Seroquel 25 mg p o  at bedtime  We will follow up in 2 weeks to assess her leap and irritability  If it is improved at all, we will increase the dose to 50 mg  We will try to keep the dose low due to her pregnancy  She agrees to try to utilize the seroquel independent of the benadryl, to utliize the lowest effective dosage of benadryl at this time  She understands the risks of the Seroquel at this time  PARQ completed including dizziness, sedation, GI distress, orthostatic hypotension and cardiovascular risks, metabolic syndrome, NMS, TD, EPS, Seizures, and others  She understands that she might be at increased risk for gestational diabetes, and the baby is at risk for low birth weight  There is a risk of abnormal muscle movements and withdrawal symptoms in the babies whose mothers took seroquel in the third trimester  She denies any side effects from current psychiatric medications      PLAN:  Discontinue zyprexa  Initiate seroquel 25mg PO HS   She will follow up in 2 weeks  She will call sooner with concerns or issues if they arise prior to scheduled appointment    Aware of 24 hour and weekend coverage for urgent situations accessed by calling [facility] main practice number  Medication management every 1 month  Aware of need to follow up with family physician for medical issues    Diagnoses and all orders for this visit:    Bipolar 1 disorder ([facility] [address] )  -     QUEtiapine (SEROquel) 25 mg tablet; Take 1 tablet (25 mg total) by mouth daily at bedtime    Other orders  -     diphenhydrAMINE (BENADRYL) 25 mg capsule; Take 100 mg by mouth daily at bedtime as needed for sleep    Current Outpatient Medications on File Prior to Visit   Medication Sig Dispense Refill   • diphenhydrAMINE (BENADRYL) 25 mg capsule Take 100 mg by mouth daily at bedtime as needed for sleep     • permethrin (ELIMITE) 5 % cream Apply now and then again in one week (Patient not taking: Reported on 2/27/2023) 60 g 1   • [DISCONTINUED] hydrOXYzine HCL (ATARAX) 25 mg tablet Take 1 tablet (25 mg total) by mouth every 6 (six) hours as needed for itching (Patient not taking: Reported on 2/27/2023) 30 tablet 5   • [DISCONTINUED] ibuprofen (MOTRIN) 600 mg tablet TAKE 1 TABLET BY MOUTH EVERY 6 HOURS AS NEEDED FOR MILD PAIN (PAIN SCORE 1 3) (Patient not taking: Reported on 2/27/2023)     • [DISCONTINUED] Junel FE 1/20 1-20 MG-MCG per tablet Take 1 tablet by mouth daily (Patient not taking: Reported on 11/2/2022)     • [DISCONTINUED] OLANZapine (ZyPREXA) 2 5 mg tablet Take 1 tablet (2 5 mg total) by mouth daily at bedtime 30 tablet 1     No current facility-administered medications on file prior to visit  Psychotherapy Provided:     Individual psychotherapy provided: Yes  Counseling was provided during the session today for 16 minutes  Supportive counseling provided  Medication changes discussed with Stephen Amezcua  Medication education provided to Stephen Amezcua  Coping strategies reviewed with Stephen Amezcua  Reassurance and supportive therapy provided  Patient will call prior to scheduled appointment if they have any issues or concerns  Patient understands they can access the office by calling the main number at any time if they are in crisis    They also understand they can call their [facility]'s crisis number or go to their nearest ED if suicidal ideation increases or if they develop a plan or intent  HPI ROS Appetite Changes and Sleep:     She reports decrease in number of sleep hours (1 hours), frequent awakenings, adequate appetite, adequate energy level   Denies homicidal ideation, denies suicidal ideation    Review Of Systems:  HPI ROS:               Medication Side Effects:  denies     Depression (10 worst): 0/10 (Was 0/10)   Anxiety (10 worst): high (Was high)   Safety concerns (SI, HI, etc): denies (Was denies)   Sleep: Poor, 1 hour (Was poor)   Energy: fair (Was fair)   Appetite: good (Was good)     General sleep disturbances   Personality no change in personality   Constitutional as noted in HPI   ENT negative   Cardiovascular negative   Respiratory negative   Gastrointestinal negative   Genitourinary negative   Musculoskeletal negative   Integumentary negative   Neurological negative   Endocrine negative   Other Symptoms pregnant, all other systems are negative     Mental Status Evaluation:    Appearance Adequate hygiene and grooming and Good eye contact   Behavior calm and cooperative   Mood anxious  Depression Scale - 0 of 10 (0 = No depression)  Anxiety Scale - high of 10 (0 = No anxiety)   Speech Normal rate and volume   Affect mood-congruent   Thought Processes Goal directed and coherent   Thought Content Does not verbalize delusional material   Associations Tightly connected   Perceptual Disturbances Denies hallucinations and does not appear to be responding to internal stimuli   Risk Potential Suicidal/Homicidal Ideation - No evidence of suicidal or homicidal ideation and patient does not verbalize suicidal or homicidal ideation  Risk of Violence - No evidence of risk for violence found on assessment  Risk of Self Mutilation - No evidence of risk for self mutilation found on assessment   Orientation oriented to person, place, time/date and situation   Memory recent and remote memory grossly intact   Consciousness alert and awake   Attention/Concentration attention span and concentration are age appropriate   Insight fair   Judgement fair   Muscle Strength and Gait normal muscle strength and normal muscle tone, normal gait/station and normal balance   Motor Activity no abnormal movements   Language no difficulty naming common objects, no difficulty repeating a phrase, no difficulty writing a sentence   Fund of Knowledge adequate knowledge of current events  adequate fund of knowledge regarding past history  adequate fund of knowledge regarding vocabulary      Past Psychiatric History   Previous diagnoses include Anxiety and Depression   Prior outpatient psychiatric treatment: denies - received meds through PCP   Prior therapy: denies   Prior inpatient psychiatric treatment: denies  Gayleen Come suicide attempts: denies   Prior self harm: yes, pulling hair and scratching face  Cut wrist in 9th grade    Prior violence or aggression: yes, harrassment charges and on probation  Has anger issues      Past Psychiatric History Update:     Inpatient Psychiatric Admission Since Last Encounter:   no  Changes to Outpatient Psychiatric Treatment Team:    no  Suicide Attempt Or Self Mutilation Since Last Encounter:   no  Incidence of Violent Behavior Since Last Encounter:   no    Traumatic History Update:     New Onset of Abuse Since Last Encounter:   no  Traumatic Events Since Last Encounter:   no    Past Medical History:    Past Medical History:   Diagnosis Date   • Anxiety    • Depression    • Self-injurious behavior         Past Surgical History:   Procedure Laterality Date   • TEAR DUCT SURGERY  1998     No Known Allergies  Substance Abuse History:    Social History     Substance and Sexual Activity   Alcohol Use Not Currently     Social History     Substance and Sexual Activity   Drug Use Never     Social History:    Social History     Socioeconomic History   • Marital status:      Spouse name: Not on file • Number of children: 1   • Years of education: some college   • Highest education level: Some college, no degree   Occupational History     Employer: [employer]   Tobacco Use   • Smoking status: Never   • Smokeless tobacco: Never   Vaping Use   • Vaping Use: Never used   Substance and Sexual Activity   • Alcohol use: Not Currently   • Drug use: Never   • Sexual activity: Yes   Other Topics Concern   • Not on file   Social History Narrative   • Not on file     Social Determinants of Health     Financial Resource Strain: Not on file   Food Insecurity: Not on file   Transportation Needs: Not on file   Physical Activity: Not on file   Stress: Not on file   Social Connections: Not on file   Intimate Partner Violence: Not on file   Housing Stability: Not on file     Family Psychiatric History:     Family History   Problem Relation Age of Onset   • Supraventricular tachycardia Mother    • Ulcerative colitis Mother    • Anxiety disorder Father    • Atrial fibrillation Father    • Leukemia Paternal Grandmother      History Review: The following portions of the patient's history were reviewed and updated as appropriate: allergies, current medications, past family history, past medical history, past social history, past surgical history and problem list     OBJECTIVE:     Vital signs in last 24 hours: There were no vitals filed for this visit  Laboratory Results:   Recent Labs (last 2 months):   No visits with results within 2 Month(s) from this visit  Latest known visit with results is:   No results found for any previous visit  I have personally reviewed all pertinent laboratory/tests results      Suicide/Homicide Risk Assessment:    Risk of Harm to Self:  The following ratings are based on assessment at the time of the interview  Recent Specific Risk Factors include: current anxiety symptoms  Demographic risk factors include:   Historical Risk Factors include: history of depression, chronic anxiety symptoms, chronic mood disorder, history of self-abusive behavior, history of impulsive behaviors, history of traumatic experiences  Protective Factors: no current suicidal ideation, access to mental health treatment, being a parent, compliant with medications, compliant with mental health treatment, having a desire to be alive, having a desire to live, responsibilities and duties to others, stable living environment, stable job, sense of determination, strong relationships, supportive family  Based on today's assessment, Yvette Hughes presents the following risk of harm to self: low    Risk of Harm to Others: The following ratings are based on assessment at the time of the interview  Protective Factors: no current homicidal ideation  Based on today's assessment, Yvette Hughes presents the following risk of harm to others: none    The following interventions are recommended: contracts for safety at present - agrees to go to ED if feeling unsafe, return in 2 weeks for reassessment, contracts for safety at present - agrees to call Crisis Intervention Service if feeling unsafe    Medications Risks/Benefits:      Risks, Benefits And Possible Side Effects Of Medications:    Discussed risks and benefits of treatment with patient including risk of parkinsonian symptoms, metabolic syndrome, tardive dyskinesia and neuroleptic malignant syndrome related to treatment with antipsychotic medications     Controlled Medication Discussion:     Not applicable    Treatment Plan:    Due for update/Updated:   yes  Last treatment plan done 3/27/23 by PAOLA Edmondson  Treatment Plan due on 9/27/23  PAOLA Stack 03/27/23    This note was shared with patient

## 2023-03-27 NOTE — BH TREATMENT PLAN
TREATMENT PLAN (Medication Management Only)        [facility]    Name and Date of Birth:  Tata Arriaga 32 y o  1995  Date of Treatment Plan: March 27, 2023  Diagnosis/Diagnoses:    1  Bipolar 1 disorder [facility])      Strengths/Personal Resources for Self-Care: taking medications as prescribed, ability to communicate needs  Area/Areas of need (in own words): anger control  1  Long Term Goal: improve anger control  Target Date:6 months - 9/27/2023  Person/Persons responsible for completion of goal: Toby Cho  2  Short Term Objective (s) - How will we reach this goal?:   A  Provider new recommended medication/dosage changes and/or continue medication(s): Medication changes: I have discontinued Linda Coates's OLANZapine  I am also having her start on QUEtiapine  B  Get to bed at the same time every night  C  Try relaxation techniques  Target Date:6 months - 9/27/2023  Person/Persons Responsible for Completion of Goal: Toby Escalera  Progress Towards Goals: starting treatment  Treatment Modality: medication management every 2 weeks, medication education at every visit  Review due 180 days from date of this plan: 6 months - 9/27/2023  Expected length of service: ongoing treatment  My Physician/PA/NP and I have developed this plan together and I agree to work on the goals and objectives  I understand the treatment goals that were developed for my treatment

## 2023-03-29 NOTE — PATIENT INSTRUCTIONS
Begin taking Lexapro 10 mg at bedtime  Follow up with dermatology for skin itching and rash  [facility]  Depression   AMBULATORY CARE:   Depression  is a medical condition that causes feelings of sadness or hopelessness that do not go away  Depression may cause you to lose interest in things you used to enjoy  These feelings may interfere with your daily life  Common symptoms include the following:   · Appetite changes, or weight gain or loss    · Trouble going to sleep or staying asleep, or sleeping too much    · Fatigue or lack of energy    · Feeling restless, irritable, or withdrawn    · Feeling worthless, hopeless, discouraged, or guilty    · Trouble concentrating, remembering things, doing daily tasks, or making decisions    · Thoughts about hurting or killing yourself    Call your local emergency number (911 in the [address] [place] [address]) if:   · You think about harming yourself or someone else  · You have done something on purpose to hurt yourself  Call your therapist or doctor if:   · Your symptoms do not improve  · You cannot make it to your next appointment  · You have new symptoms  · You have questions or concerns about your condition or care  The following resources are available at any time to help you, if needed:   · [address] [place] [address]: [phone] (6-518-885-UVQY)     · Suicide Hotline: 0-574.437.4344 (4-569-NSQPVFI)     · For a list of international numbers: https://save org/find-help/international-resources/    Treatment for depression  may include medicine to relieve depression  Medicine is often used together with therapy  Therapy is a way for you to talk about your feelings and anything that may be causing depression  Therapy can be done alone or in a group  It may also be done with family members or a significant other  Self-care:   · Get regular physical activity  Try to be active for 30 minutes, 3 to 5 days a week  Physical activity can help relieve depression  Work with your healthcare provider to develop a plan that you enjoy  It may help to ask someone to be active with you  · Create a regular sleep schedule  A routine can help you relax before bed  Listen to music, read, or do yoga  Try to go to bed and wake up at the same time every day  Sleep is important for emotional health  · Eat a variety of healthy foods  Healthy foods include fruits, vegetables, whole-grain breads, low-fat dairy products, lean meats, fish, and cooked beans  A healthy meal plan is low in fat, salt, and added sugar  · Do not drink alcohol or use drugs  Alcohol and drugs can make depression worse  Talk to your therapist or doctor if you need help quitting  Follow up with your healthcare provider as directed: Your healthcare provider will monitor your progress at follow-up visits  He or she will also monitor your medicine if you take antidepressants  Your healthcare provider will ask if the medicine is helping  Tell him or her about any side effects or problems you may have with your medicine  The type or amount of medicine may need to be changed  Write down your questions so you remember to ask them during your visits  © Copyright [address] [place] [address] Information is for End User's use only and may not be sold, redistributed or otherwise used for commercial purposes  All illustrations and images included in CareNotes® are the copyrighted property of [employer]  or [facility] Letty Gonzalez   The above information is an  only  It is not intended as medical advice for individual conditions or treatments  Talk to your doctor, nurse or pharmacist before following any medical regimen to see if it is safe and effective for you  ,

## 2023-04-04 ENCOUNTER — TELEPHONE (OUTPATIENT)
Dept: DERMATOLOGY | Facility: CLINIC | Age: 28
End: 2023-04-04

## 2023-04-04 NOTE — TELEPHONE ENCOUNTER
Called Pt to schedule ASAP referral   Pt advised she no longer needs Derm services    Closing referral  4/4/23 JF

## 2023-04-24 ENCOUNTER — APPOINTMENT (OUTPATIENT)
Dept: LAB | Facility: HOSPITAL | Age: 28
End: 2023-04-24
Attending: STUDENT IN AN ORGANIZED HEALTH CARE EDUCATION/TRAINING PROGRAM

## 2023-04-24 DIAGNOSIS — L29.9 PRURITUS: ICD-10-CM

## 2023-04-24 DIAGNOSIS — D50.9 IRON DEFICIENCY ANEMIA, UNSPECIFIED IRON DEFICIENCY ANEMIA TYPE: ICD-10-CM

## 2023-04-24 DIAGNOSIS — R21 RASH AND NONSPECIFIC SKIN ERUPTION: ICD-10-CM

## 2023-04-24 DIAGNOSIS — Z00.00 ANNUAL PHYSICAL EXAM: ICD-10-CM

## 2023-04-24 DIAGNOSIS — Z34.81 PRENATAL CARE, SUBSEQUENT PREGNANCY, FIRST TRIMESTER: ICD-10-CM

## 2023-04-24 LAB
ABO GROUP BLD: NORMAL
ALBUMIN SERPL BCP-MCNC: 4.5 G/DL (ref 3.5–5)
ALP SERPL-CCNC: 78 U/L (ref 34–104)
ALT SERPL W P-5'-P-CCNC: 26 U/L (ref 7–52)
ANION GAP SERPL CALCULATED.3IONS-SCNC: 7 MMOL/L (ref 4–13)
AST SERPL W P-5'-P-CCNC: 26 U/L (ref 13–39)
BACTERIA UR QL AUTO: ABNORMAL /HPF
BASOPHILS # BLD AUTO: 0.03 THOUSANDS/ΜL (ref 0–0.1)
BASOPHILS NFR BLD AUTO: 0 % (ref 0–1)
BILIRUB SERPL-MCNC: 0.53 MG/DL (ref 0.2–1)
BILIRUB UR QL STRIP: NEGATIVE
BLD GP AB SCN SERPL QL: NEGATIVE
BUN SERPL-MCNC: 11 MG/DL (ref 5–25)
CALCIUM SERPL-MCNC: 9.7 MG/DL (ref 8.4–10.2)
CHLORIDE SERPL-SCNC: 104 MMOL/L (ref 96–108)
CLARITY UR: CLEAR
CO2 SERPL-SCNC: 26 MMOL/L (ref 21–32)
COLOR UR: ABNORMAL
CREAT SERPL-MCNC: 0.75 MG/DL (ref 0.6–1.3)
EOSINOPHIL # BLD AUTO: 0.13 THOUSAND/ΜL (ref 0–0.61)
EOSINOPHIL NFR BLD AUTO: 2 % (ref 0–6)
ERYTHROCYTE [DISTWIDTH] IN BLOOD BY AUTOMATED COUNT: 12.6 % (ref 11.6–15.1)
FERRITIN SERPL-MCNC: 15 NG/ML (ref 8–388)
GFR SERPL CREATININE-BSD FRML MDRD: 109 ML/MIN/1.73SQ M
GLUCOSE P FAST SERPL-MCNC: 78 MG/DL (ref 65–99)
GLUCOSE UR STRIP-MCNC: NEGATIVE MG/DL
HCT VFR BLD AUTO: 40.8 % (ref 34.8–46.1)
HGB BLD-MCNC: 13.5 G/DL (ref 11.5–15.4)
HGB UR QL STRIP.AUTO: NEGATIVE
IMM GRANULOCYTES # BLD AUTO: 0.02 THOUSAND/UL (ref 0–0.2)
IMM GRANULOCYTES NFR BLD AUTO: 0 % (ref 0–2)
KETONES UR STRIP-MCNC: NEGATIVE MG/DL
LEUKOCYTE ESTERASE UR QL STRIP: ABNORMAL
LYMPHOCYTES # BLD AUTO: 1.67 THOUSANDS/ΜL (ref 0.6–4.47)
LYMPHOCYTES NFR BLD AUTO: 24 % (ref 14–44)
MCH RBC QN AUTO: 28.7 PG (ref 26.8–34.3)
MCHC RBC AUTO-ENTMCNC: 33.1 G/DL (ref 31.4–37.4)
MCV RBC AUTO: 87 FL (ref 82–98)
MONOCYTES # BLD AUTO: 0.53 THOUSAND/ΜL (ref 0.17–1.22)
MONOCYTES NFR BLD AUTO: 8 % (ref 4–12)
MUCOUS THREADS UR QL AUTO: ABNORMAL
NEUTROPHILS # BLD AUTO: 4.64 THOUSANDS/ΜL (ref 1.85–7.62)
NEUTS SEG NFR BLD AUTO: 66 % (ref 43–75)
NITRITE UR QL STRIP: NEGATIVE
NON-SQ EPI CELLS URNS QL MICRO: ABNORMAL /HPF
NRBC BLD AUTO-RTO: 0 /100 WBCS
PH UR STRIP.AUTO: 7 [PH]
PLATELET # BLD AUTO: 169 THOUSANDS/UL (ref 149–390)
PMV BLD AUTO: 10.9 FL (ref 8.9–12.7)
POTASSIUM SERPL-SCNC: 3.5 MMOL/L (ref 3.5–5.3)
PROT SERPL-MCNC: 7.8 G/DL (ref 6.4–8.4)
PROT UR STRIP-MCNC: NEGATIVE MG/DL
RBC # BLD AUTO: 4.7 MILLION/UL (ref 3.81–5.12)
RBC #/AREA URNS AUTO: ABNORMAL /HPF
RH BLD: POSITIVE
RUBV IGG SERPL IA-ACNC: 68.2 IU/ML
SODIUM SERPL-SCNC: 137 MMOL/L (ref 135–147)
SP GR UR STRIP.AUTO: 1.02 (ref 1–1.03)
SPECIMEN EXPIRATION DATE: NORMAL
UROBILINOGEN UR STRIP-ACNC: <2 MG/DL
WBC # BLD AUTO: 7.02 THOUSAND/UL (ref 4.31–10.16)
WBC #/AREA URNS AUTO: ABNORMAL /HPF

## 2023-04-25 LAB
HBV SURFACE AG SER QL: NORMAL
HCV AB SER QL: NORMAL
HIV 1+2 AB+HIV1 P24 AG SERPL QL IA: NORMAL
HIV 2 AB SERPL QL IA: NORMAL
HIV1 AB SERPL QL IA: NORMAL
HIV1 P24 AG SERPL QL IA: NORMAL
TREPONEMA PALLIDUM IGG+IGM AB [PRESENCE] IN SERUM OR PLASMA BY IMMUNOASSAY: NORMAL

## 2023-04-26 ENCOUNTER — INITIAL PRENATAL (OUTPATIENT)
Age: 28
End: 2023-04-26

## 2023-04-26 VITALS
WEIGHT: 134 LBS | SYSTOLIC BLOOD PRESSURE: 100 MMHG | HEIGHT: 64 IN | BODY MASS INDEX: 22.88 KG/M2 | DIASTOLIC BLOOD PRESSURE: 64 MMHG

## 2023-04-26 DIAGNOSIS — O23.41 URINARY TRACT INFECTION IN MOTHER DURING FIRST TRIMESTER OF PREGNANCY: ICD-10-CM

## 2023-04-26 DIAGNOSIS — Z11.3 SCREENING FOR STDS (SEXUALLY TRANSMITTED DISEASES): ICD-10-CM

## 2023-04-26 DIAGNOSIS — Z3A.11 11 WEEKS GESTATION OF PREGNANCY: Primary | ICD-10-CM

## 2023-04-26 LAB
BACTERIA UR CULT: ABNORMAL
BACTERIA UR CULT: ABNORMAL
SL AMB  POCT GLUCOSE, UA: NEGATIVE
SL AMB POCT URINE PROTEIN: NEGATIVE

## 2023-04-26 RX ORDER — AMOXICILLIN 500 MG/1
500 CAPSULE ORAL EVERY 8 HOURS SCHEDULED
Qty: 15 CAPSULE | Refills: 0 | Status: SHIPPED | OUTPATIENT
Start: 2023-04-26 | End: 2023-05-01

## 2023-04-26 NOTE — PROGRESS NOTES
"Problem   11 Weeks Gestation of Pregnancy    Blood Type: O positive  Antibody negative  Pap 2021  GC/CT -  collected today   PN1 Labs- wnl   28 Week Labs-  COVID vaccine- x2  Flu vaccine - compelted  Blue folder- reviewed    Genetic screening-   AFP-  Level 1-   Level 2-    Yellow folder-  TDAP -   Delivery consent-  Breast pump -   Pediatrician -    Perineal massage -  GBS swab -   IOL -       11 weeks gestation of pregnancy  PN1  Minna Burrows is a 32y o  year old  at 11w1d who presents for initial prenatal visit  Planned pregnancy  LMP: Patient's last menstrual period was 2023 (exact date)  Gestational age 7w3d based on LMP No, consistent with early US Yes    4  Para 1021           Previous C Section: No    Patient thinks she may have a UTI- reports low back pain and feeling like she does completely empty her bladder with urination  +urgency, negative dysuria or frequency  Urine culture + for small amount of GBS and WBC  Denies flank pain, fevers, chills  PMHx noncontributory  Her obstetrical, medical, surgical and family history was reviewed  Prepregnancy BMI: 21 10    Vitals:    23 1538   BP: 100/64   BP Location: Left arm   Patient Position: Sitting   Cuff Size: Standard   Weight: 60 8 kg (134 lb)   Height: 5' 4\" (1 626 m)     Physical Exam   Constitutional: Alert  Normal appearance  No acute distress  HEENT: Atraumatic, Normocephalic, Conjunctivae clear  Heart: pulses regular  Lungs: Effort normal   Abdomen: soft, nontender, gravid uterus  Musculoskeletal: Normal ROM  Skin: warm and dry  Psychological: Normal mood, thought content, behavior & judgement   Pelvic exam was performed with patient supine, lithotomy position  External genital is without rash, tenderness, lesion, or skin changes     Inguinal canal negative for hernia and adenopathy bilaterally   Urethral meatus and urethra are normal  Vagina without erythema, lesions, tenderness, or foreign body " in the vagina  No unusual vaginal discharge   Cervix is closed without discharge or lesion  Uterus is enlarged, consistent with 12 week gestation     She has nausea and vomiting  She has had no vaginal bleeding  Patients has no complaints  Denies pelvic pain or cramping  NT scan scheduled on 5/8  She is planning to complete genetic screening  Allergies: Patient has no known allergies  Medication use :   Current Outpatient Medications   Medication Sig Dispense Refill   • Prenatal Vit-Fe Fumarate-FA (PRENATAL VITAMIN PO) Take by mouth     • QUEtiapine (SEROquel) 50 mg tablet Take 1 tablet (50 mg total) by mouth daily at bedtime 30 tablet 1     No current facility-administered medications for this visit  She is a non-smoker    Prenatal Labs   O positive  Antibody negative  CBC wnl  Hep B nonreactive  HIV nonreactive  RPR nonreactive  Rubella Immune  GCCT collected today   Pap 04/21/2021  NILM A Pap smear was not obtained,    Risk factors for this pregnancy include: none    Patient Education: Patient was counseled regarding diet, exercise, weight gain, foods to avoid, vaccines in pregnancy, aneuploidy screening, travel precautions to include seat belt use and VTE risk reduction  She has been provided our pregnancy packet which includes pregnancy warning signs,how and when to contact providers, visit intervals, Maternal fetal medicine information, medication recommendations that are safe during pregnancy, dietary suggestions, activity recommendations, breastfeeding information as well as websites for additional information, and delivery location

## 2023-04-26 NOTE — PATIENT INSTRUCTIONS
Pregnancy at 11 to 14 Weeks   AMBULATORY CARE:   Changes happening to your body: You are now at the end of your first trimester and entering your second trimester  Morning sickness usually goes away by this time  You may have other symptoms such as fatigue, frequent urination, and headaches  You may have gained 2 to 4 pounds by now  Seek care immediately if:   You have pain or cramping in your abdomen or low back  You have heavy vaginal bleeding or clotting  You pass material that looks like tissue or large clots  Collect the material and bring it with you  Call your doctor or obstetrician if:   You cannot keep food or drinks down, and you are losing weight  You have light vaginal bleeding  You have chills or a fever  You have vaginal itching, burning, or pain  You have yellow, green, white, or foul-smelling vaginal discharge  You have pain or burning when you urinate, less urine than usual, or pink or bloody urine  You have questions or concerns about your condition or care  How to care for yourself at this stage of your pregnancy:       Get plenty of rest   You may feel more tired than normal  You may need to take naps or go to bed earlier  Manage nausea and vomiting  Avoid fatty and spicy foods  Eat small meals throughout the day instead of large meals  Madelyn may help to decrease nausea  Ask your healthcare provider about other ways of decreasing nausea and vomiting  Eat a variety of healthy foods  Healthy foods include fruits, vegetables, whole-grain breads, low-fat dairy foods, beans, lean meats, and fish  Drink liquids as directed  Ask how much liquid to drink each day and which liquids are best for you  Limit caffeine to less than 200 milligrams each day  Limit your intake of fish to 2 servings each week  Choose fish low in mercury such as canned light tuna, shrimp, salmon, cod, or tilapia   Do not  eat fish high in mercury such as swordfish, tilefish, rigoberto mackerel, and shark  Take prenatal vitamins as directed  Your need for certain vitamins and minerals, such as folic acid, increases during pregnancy  Prenatal vitamins provide some of the extra vitamins and minerals you need  Prenatal vitamins may also help to decrease the risk of certain birth defects  Do not smoke  Smoking increases your risk of a miscarriage and other health problems during your pregnancy  Smoking can cause your baby to be born too early or weigh less at birth  Ask your healthcare provider for information if you need help quitting  Do not drink alcohol  Alcohol passes from your body to your baby through the placenta  It can affect your baby's brain development and cause fetal alcohol syndrome (FAS)  FAS is a group of conditions that causes mental, behavior, and growth problems  Talk to your healthcare provider before you take any medicines  Many medicines may harm your baby if you take them when you are pregnant  Do not take any medicines, vitamins, herbs, or supplements without first talking to your healthcare provider  Never use illegal or street drugs (such as marijuana or cocaine) while you are pregnant  Safety tips during pregnancy:   Avoid hot tubs and saunas  Do not use a hot tub or sauna while you are pregnant, especially during your first trimester  Hot tubs and saunas may raise your baby's temperature and increase the risk of birth defects  Avoid toxoplasmosis  This is an infection caused by eating raw meat or being around infected cat feces  It can cause birth defects, miscarriages, and other problems  Wash your hands after you touch raw meat  Make sure any meat is well-cooked before you eat it  Avoid raw eggs and unpasteurized milk  Use gloves or ask someone else to clean your cat's litter box while you are pregnant  Changes happening with your baby: Your baby has fully formed fingernails and toenails  Your baby's heartbeat can now be heard   Ask your healthcare provider if you can listen to your baby's heartbeat  By week 14, your baby is over 4 inches long from the top of the head to the rump (baby's bottom)  Your baby weighs over 3 ounces  Prenatal care:  Prenatal care is a series of visits with your healthcare provider throughout your pregnancy  During the first 28 weeks of your pregnancy, you will see your healthcare provider 1 time each month  Prenatal care can help prevent problems during pregnancy and childbirth  Your healthcare provider will check your blood pressure and weight  Your baby's heart rate will also be checked  You may also need the following at some visits:  A pelvic exam  allows your healthcare provider to see your cervix (the bottom part of your uterus)  Your healthcare provider will use a speculum to open your vagina  He or she will check the size and shape of your uterus  Blood tests  may be done to check for any of the following:    Gestational diabetes or anemia (low iron level)    Blood type or Rh factor, or certain birth defects    Immunity to certain diseases, such as chickenpox or rubella    An infection, such as a sexually transmitted infection, HIV, or hepatitis B    Hepatitis B  may need to be prevented or treated  Hepatitis B is inflammation of the liver caused by the hepatitis B virus (HBV)  HBV can spread from a mother to her baby during delivery  You will be checked for HBV as early as possible in the first trimester of each pregnancy  You need the test even if you received the hepatitis B vaccine or were tested before  You may need to have an HBV infection treated before you give birth  Urine tests  may also be done to check for sugar and protein  These can be signs of gestational diabetes or preeclampsia  Urine tests may also be done to check for signs of infection  A fetal ultrasound  shows pictures of your baby inside your uterus  The pictures are used to check your baby's development, movement, and position  Genetic disorder screening tests  may be offered to you  These tests check your baby's risk for genetic disorders such as Down syndrome  A screening test includes a blood test and ultrasound  Follow up with your doctor or obstetrician as directed:  Go to all prenatal visits  Write down your questions so you remember to ask them during your visits  © [employer] 2022 Information is for End User's use only and may not be sold, redistributed or otherwise used for commercial purposes  The above information is an  only  It is not intended as medical advice for individual conditions or treatments  Talk to your doctor, nurse or pharmacist before following any medical regimen to see if it is safe and effective for you  Nausea and Vomiting in Pregnancy   WHAT YOU NEED TO KNOW:   What do I need to know about nausea and vomiting in pregnancy? Nausea and vomiting can happen any time of day  These symptoms usually start before the 9th week of pregnancy, and end by the 14th week (second trimester)  Some women can have nausea and vomiting for a longer time  These symptoms can make it hard for you to do your daily activities  What increases my risk for nausea and vomiting in pregnancy? Being pregnant with more than one baby    Nausea and vomiting in a past pregnancy    Having a sister or mother who had nausea and vomiting during pregnancy    History of migraine headaches or motion sickness    Being pregnant with a female baby    How is nausea and vomiting in pregnancy treated? Treatment for nausea and vomiting in pregnancy is usually not needed  You can make changes in the foods you eat and in your activities to help manage your symptoms  You may need to try several things to learn what works for you  Talk to your healthcare provider if your symptoms do not decrease with the changes suggested below  What nutrition changes can I make to manage nausea and vomiting?    Eat smaller meals, more often  Eat a small snack, such as crackers, dry cereal, or a small sandwich before you go to bed  Eat some crackers or dry toast before you get out of bed in the morning  Get out of bed slowly  Sudden movements could cause you to get dizzy and nauseated  Eat bland foods when you feel nauseated  Examples of bland foods include dry toast, dry cereal, plain pasta, white rice, and bread  Other bland foods include saltine crackers, bananas, gelatin, and pretzels  Avoid spicy, greasy, and fried foods  Drink liquids that contain ginger  Drink ginger ale made with real ginger or ginger tea made with fresh grated ginger  Ginger capsules or ginger candies may also help to decrease nausea and vomiting  Drink liquids between meals instead of with meals  Wait at least 30 minutes after you eat to drink liquids  Drink small amounts of liquids often throughout the day to prevent dehydration  Ask how much liquid you should drink each day  What other changes can I make to manage nausea and vomiting? Avoid smells that bother you  Strong odors may cause nausea and vomiting to start, or make it worse  Do not brush your teeth right after you eat  if it makes you nauseated  Rest when you need to  Start activity slowly and work up to your usual routine as you start to feel better  Talk to your healthcare provider about your prenatal vitamins  Prenatal vitamins can cause nausea for some women  Try taking your prenatal vitamin at night or with a snack  If this change does not help, your healthcare provider may recommend a different type of vitamin  Light to moderate exercise  may help to decrease your symptoms  It may also help you to sleep better at night  Ask your healthcare provider about the best exercise plan for you  When should I seek immediate care? You are dizzy, cold, and thirsty, and your eyes and mouth are dry  You are urinating very little or not at all      You are dizzy or "lightheaded when you stand up  You see blood or material that looks like coffee grounds in your vomit  When should I call my doctor? You vomit more than 4 times in 1 day  You have not been able to keep liquids down for more than 1 day  You lose more than 2 pounds  You have a fever  Your nausea and vomiting continue longer than 14 weeks  You have questions or concerns about your condition or care  CARE AGREEMENT:   You have the right to help plan your care  Learn about your health condition and how it may be treated  Discuss treatment options with your healthcare providers to decide what care you want to receive  You always have the right to refuse treatment  The above information is an  only  It is not intended as medical advice for individual conditions or treatments  Talk to your doctor, nurse or pharmacist before following any medical regimen to see if it is safe and effective for you  ©  [facility]  Information is for End User's use only and may not be sold, redistributed or otherwise used for commercial purposes  Valuable Online Resource:    [facility] pregnancy essential guide    [email or web address] lopez/      On the right side of the screen is a 50 page guide providing valuable information about your entire pregnancy  On the left hand side of the site you will see several other links to great information and resources that [facility] offers     If you click on the tab that says \"Pregnancy and Birth Packet\" this opens another  guide to labor and delivery information as well as breast feeding information,  care, pediatricians, car seat safety and much more     The [facility] Baby and [address] tab has a virtual tour of the new L&D unit, as well as valuable information about classes that are offered, breast feeding support, support groups and much more       I highly recommend the virtual Breast Feeding class, " very informational even if you have breast fed in the past  Check for available dates ! Click around and enjoy all we have to offer!     Please note that all information in regards to locations and visiting hours have not been updated due to 2 Laura Shukla delivery location is [address] [place] [address],[address], [address] [address] [place] [address]

## 2023-04-26 NOTE — ASSESSMENT & PLAN NOTE
"Tia Kendall is a 32y o  year old  at 11w1d who presents for initial prenatal visit  Planned pregnancy  LMP: Patient's last menstrual period was 2023 (exact date)  Gestational age 7w3d based on LMP No, consistent with early US Yes    4  Para 1021           Previous C Section: No    Patient thinks she may have a UTI- reports low back pain and feeling like she does completely empty her bladder with urination  +urgency, negative dysuria or frequency  Urine culture + for small amount of GBS and WBC  Denies flank pain, fevers, chills  PMHx noncontributory  Her obstetrical, medical, surgical and family history was reviewed  Prepregnancy BMI: 21 10    Vitals:    23 1538   BP: 100/64   BP Location: Left arm   Patient Position: Sitting   Cuff Size: Standard   Weight: 60 8 kg (134 lb)   Height: 5' 4\" (1 626 m)     Physical Exam   Constitutional: Alert  Normal appearance  No acute distress  HEENT: Atraumatic, Normocephalic, Conjunctivae clear  Heart: pulses regular  Lungs: Effort normal   Abdomen: soft, nontender, gravid uterus  Musculoskeletal: Normal ROM  Skin: warm and dry  Psychological: Normal mood, thought content, behavior & judgement   Pelvic exam was performed with patient supine, lithotomy position  External genital is without rash, tenderness, lesion, or skin changes  Inguinal canal negative for hernia and adenopathy bilaterally   Urethral meatus and urethra are normal  Vagina without erythema, lesions, tenderness, or foreign body in the vagina  No unusual vaginal discharge   Cervix is closed without discharge or lesion  Uterus is enlarged, consistent with 12 week gestation     She has nausea and vomiting  She has had no vaginal bleeding  Patients has no complaints  Denies pelvic pain or cramping  NT scan scheduled on   She is planning to complete genetic screening  Allergies: Patient has no known allergies    Medication use :   Current Outpatient " Medications   Medication Sig Dispense Refill   • Prenatal Vit-Fe Fumarate-FA (PRENATAL VITAMIN PO) Take by mouth     • QUEtiapine (SEROquel) 50 mg tablet Take 1 tablet (50 mg total) by mouth daily at bedtime 30 tablet 1     No current facility-administered medications for this visit  She is a non-smoker    Prenatal Labs   O positive  Antibody negative  CBC wnl  Hep B nonreactive  HIV nonreactive  RPR nonreactive  Rubella Immune  GCCT collected today   Pap 04/21/2021  NILM A Pap smear was not obtained,    Risk factors for this pregnancy include: none    Patient Education: Patient was counseled regarding diet, exercise, weight gain, foods to avoid, vaccines in pregnancy, aneuploidy screening, travel precautions to include seat belt use and VTE risk reduction  She has been provided our pregnancy packet which includes pregnancy warning signs,how and when to contact providers, visit intervals, Maternal fetal medicine information, medication recommendations that are safe during pregnancy, dietary suggestions, activity recommendations, breastfeeding information as well as websites for additional information, and delivery location

## 2023-04-26 NOTE — PROGRESS NOTES
Patient is here for prenatal ob visit today  No question's or concerns at this time  GA: 11w1d  Estimated Date of Delivery: 11/14/2023  [id number]  Urine: Protein Negative / Glucose Negative  Denies loss of fluid, vaginal bleeding and contractions  Labs are completed and up to date

## 2023-04-27 ENCOUNTER — TELEPHONE (OUTPATIENT)
Dept: PSYCHIATRY | Facility: CLINIC | Age: 28
End: 2023-04-27

## 2023-04-27 ENCOUNTER — TELEPHONE (OUTPATIENT)
Age: 28
End: 2023-04-27

## 2023-04-27 LAB
A ALTERNATA IGE QN: <0.1 KUA/I
A FUMIGATUS IGE QN: <0.1 KUA/I
BERMUDA GRASS IGE QN: <0.1 KUA/I
BOXELDER IGE QN: <0.1 KUA/I
C HERBARUM IGE QN: <0.1 KUA/I
C TRACH DNA SPEC QL NAA+PROBE: NEGATIVE
CAT DANDER IGE QN: <0.1 KUA/I
CMN PIGWEED IGE QN: <0.1 KUA/I
COMMON RAGWEED IGE QN: <0.1 KUA/I
COTTONWOOD IGE QN: <0.1 KUA/I
D FARINAE IGE QN: 0.19 KUA/I
D PTERONYSS IGE QN: 0.88 KUA/I
DOG DANDER IGE QN: <0.1 KUA/I
LONDON PLANE IGE QN: <0.1 KUA/I
MOUSE URINE PROT IGE QN: <0.1 KUA/I
MT JUNIPER IGE QN: <0.1 KUA/I
MUGWORT IGE QN: <0.1 KUA/I
N GONORRHOEA DNA SPEC QL NAA+PROBE: NEGATIVE
P NOTATUM IGE QN: <0.1 KUA/I
ROACH IGE QN: <0.1 KUA/I
SHEEP SORREL IGE QN: <0.1 KUA/I
SILVER BIRCH IGE QN: <0.1 KUA/I
TIMOTHY IGE QN: <0.1 KUA/I
TOTAL IGE SMQN RAST: 20.5 KU/L (ref 0–113)
WALNUT IGE QN: <0.1 KUA/I
WHITE ASH IGE QN: <0.1 KUA/I
WHITE ELM IGE QN: <0.1 KUA/I
WHITE MULBERRY IGE QN: <0.1 KUA/I
WHITE OAK IGE QN: <0.1 KUA/I

## 2023-04-27 NOTE — TELEPHONE ENCOUNTER
----- Message from Jocelin Palafox DO sent at 4/27/2023 12:36 PM EDT -----  Allergy panel on significant for allergy against dust mites

## 2023-05-05 ENCOUNTER — ROUTINE PRENATAL (OUTPATIENT)
Dept: PERINATAL CARE | Facility: OTHER | Age: 28
End: 2023-05-05

## 2023-05-05 VITALS
HEART RATE: 73 BPM | HEIGHT: 64 IN | DIASTOLIC BLOOD PRESSURE: 48 MMHG | WEIGHT: 135.8 LBS | BODY MASS INDEX: 23.18 KG/M2 | SYSTOLIC BLOOD PRESSURE: 92 MMHG

## 2023-05-05 DIAGNOSIS — Z36.82 ENCOUNTER FOR NUCHAL TRANSLUCENCY TESTING: ICD-10-CM

## 2023-05-05 DIAGNOSIS — O36.80X0 ENCOUNTER TO DETERMINE FETAL VIABILITY OF PREGNANCY, SINGLE OR UNSPECIFIED FETUS: Primary | ICD-10-CM

## 2023-05-05 DIAGNOSIS — Z3A.12 12 WEEKS GESTATION OF PREGNANCY: ICD-10-CM

## 2023-05-05 DIAGNOSIS — Z34.90 EARLY STAGE OF PREGNANCY: ICD-10-CM

## 2023-05-05 NOTE — PROGRESS NOTES
"Patient chose to have [facility] Non-invasive Prenatal Screen with fetal sex (per pt request)  Patient given brochure and is aware [facility] will contact their insurance and coordinate coverage  Patient made aware she will need to respond to text message or e-mail from [employer] within 2 business days or testing will be run through insurance  Patient informed text message will come from area code  \"415\"  Provided [employer] # [phone] and web site : [email or web address] [email or web address]  \"[place] your test online\" card with barcode and test tube ID provided to patient  Reviewed [facility]'s web site states 5-7 business days for results via their portal    [employer] message will be sent to patient when MFM receives results /provider reviews  2 vials of blood drawn from right arm by Sandra HAMLIN  Patient tolerated blood draw without difficulty  Specimens labeled with patient identifiers (name, date of birth, specimen collection date), order and specimen were verified with patient, packed and sent via [employer]  Copy of lab order scanned to Epic media  Maternal Fetal Medicine will have results in approximately 7-10 business days and will call patient or notify via [address]  Patient aware viewing lab result online will reveal fetal sex if ordered  Patient verbalized understanding of all instructions and no questions at this time      "

## 2023-05-05 NOTE — LETTER
May 5, 2023     MD [address]    Patient: Hank Mendenhall   YOB: 1995   Date of Visit: 5/5/2023       Dear Dr Lendon Sacks: Thank you for referring Hank Mendenhall to me for evaluation  Below are my notes for this consultation  If you have questions, please do not hesitate to call me  I look forward to following your patient along with you  Sincerely,        Chester Redmond MD        CC: No Recipients  Chester Redmond MD  5/5/2023  8:45 AM  Sign when Signing Visit  Ignacio Mckeon presents today for genetic screening ultrasound  This is her fourth pregnancy  She has a history of 1 miscarriage and one termination  She had a full-term vaginal delivery without complications in [facility] requiring induction of labor at 41 weeks gestation  She has a history of bipolar disease currently being treated with Seroquel 50 mg daily  Substance use history and family medical history are otherwise unremarkable  A review of systems is otherwise significant for right upper back pain  This is most consistent with muscular strain  We discussed the options for genetic screening, including but not limited to first trimester screening, second trimester screening, combined first and second trimester screening, noninvasive prenatal screening (NIPS) for patients at high risk and diagnostic screening through the use of CVS and amniocentesis  We discussed the risks and benefits of each approach including the sensitivities and false positive rates as well as the difference between a screening test and a diagnostic test  At the conclusion of our discussion the patient elected noninvasive prenatal testing utilizing the [facility] Non-invasive prenatal screening (NIPS) test  The patient had this blood work drawn in the office and the results should be available approximately 7-10 days after her blood draw  Her results will be reported from [facility]      We reviewed "that low-dose Seroquel has mostly an antihistamine effect and is compatible with use during pregnancy without concerns for significant congenital birth defects  We discussed follow-up in detail and I recommend an anatomy ultrasound be scheduled for 20 weeks gestation  Thank you very much for allowing us to participate in the care of this very nice patient  Should you have any questions, please do not hesitate to contact me  Portions of the record may have been created with voice recognition software  Occasional wrong word or \"sound a like\" substitutions may have occurred due to the inherent limitations of voice recognition software  Read the chart carefully and recognize, using context, where substitutions have occurred      "

## 2023-05-05 NOTE — PROGRESS NOTES
"Rosy Collins presents today for genetic screening ultrasound  This is her fourth pregnancy  She has a history of 1 miscarriage and one termination  She had a full-term vaginal delivery without complications in [address]64 requiring induction of labor at 41 weeks gestation  She has a history of bipolar disease currently being treated with Seroquel 50 mg daily  Substance use history and family medical history are otherwise unremarkable  A review of systems is otherwise significant for right upper back pain  This is most consistent with muscular strain  We discussed the options for genetic screening, including but not limited to first trimester screening, second trimester screening, combined first and second trimester screening, noninvasive prenatal screening (NIPS) for patients at high risk and diagnostic screening through the use of CVS and amniocentesis  We discussed the risks and benefits of each approach including the sensitivities and false positive rates as well as the difference between a screening test and a diagnostic test  At the conclusion of our discussion the patient elected noninvasive prenatal testing utilizing the [facility] Non-invasive prenatal screening (NIPS) test  The patient had this blood work drawn in the office and the results should be available approximately 7-10 days after her blood draw  [facility]  We reviewed that low-dose Seroquel has mostly an antihistamine effect and is compatible with use during pregnancy without concerns for significant congenital birth defects  We discussed follow-up in detail and I recommend an anatomy ultrasound be scheduled for 20 weeks gestation  Thank you very much for allowing us to participate in the care of this very nice patient  Should you have any questions, please do not hesitate to contact me  Portions of the record may have been created with voice recognition software   Occasional wrong word or \"sound a like\" " substitutions may have occurred due to the inherent limitations of voice recognition software  Read the chart carefully and recognize, using context, where substitutions have occurred

## 2023-05-16 ENCOUNTER — TELEMEDICINE (OUTPATIENT)
Dept: PSYCHIATRY | Facility: CLINIC | Age: 28
End: 2023-05-16

## 2023-05-16 DIAGNOSIS — F31.9 BIPOLAR 1 DISORDER (HCC): Primary | ICD-10-CM

## 2023-05-16 RX ORDER — QUETIAPINE FUMARATE 25 MG/1
75 TABLET, FILM COATED ORAL
Qty: 90 TABLET | Refills: 0 | Status: SHIPPED | OUTPATIENT
Start: 2023-05-16

## 2023-05-16 NOTE — PSYCH
Virtual Regular Visit    Verification of patient location:    Patient is located in the following state in which I hold an active license [place]    Problem List Items Addressed This Visit        Other    Bipolar 1 disorder ([address] ) - Primary    Relevant Medications    QUEtiapine (SEROquel) 25 mg tablet          Encounter provider PAOLA Barraza    Provider located at    [address] [place] [id number] [phone]    Recent Visits  Date Type Provider Dept   05/16/23 Lindsey Riojas [address] [facility]   Showing recent visits within past 7 days and meeting all other requirements  Future Appointments  No visits were found meeting these conditions  Showing future appointments within next 150 days and meeting all other requirements           The patient was identified by name and date of birth  Patient was informed that this is a telemedicine visit and that the visit is being conducted throughthe [facility]  She agrees to proceed     My office door was closed  No one else was in the room  She acknowledged consent and understanding of privacy and security of the video platform  The patient has agreed to participate and understands they can discontinue the visit at any time  Patient is aware this is a billable service  HPI     Current Outpatient Medications   Medication Sig Dispense Refill   • Prenatal Vit-Fe Fumarate-FA (PRENATAL VITAMIN PO) Take by mouth     • QUEtiapine (SEROquel) 25 mg tablet Take 3 tablets (75 mg total) by mouth daily at bedtime 90 tablet 0     No current facility-administered medications for this visit  Review of Systems  Video Exam    There were no vitals filed for this visit  Physical Exam   As a result of this visit, I have referred the patient for further respiratory evaluation   No    I spent 30 minutes directly with the patient during this visit  VIRTUAL "VISIT DISCLAIMER    Sueann Dance acknowledges that she has consented to an online visit or consultation  She understands that the online visit is based solely on information provided by her, and that, in the absence of a face-to-face physical evaluation by the physician, the diagnosis she receives is both limited and provisional in terms of accuracy and completeness  This is not intended to replace a full medical face-to-face evaluation by the physician  Sueann Dance understands and accepts these terms  MEDICATION MANAGEMENT NOTE        [address]    Name and Date of Birth:  Sueann Dance 32 y o  1995 MRN: [id number]    Date of Visit: May 16, 2023    Allergies   Allergen Reactions   • Dust Mite Extract Hives       Visit Time    Visit Start Time: 0224  Visit Stop Time: 1130  Total Visit Duration: 30 minutes    SUBJECTIVE:    Larissa Syed is seen today for a follow up for Bipolar Disorder  She continues to experience on and off symptoms since the last visit  Keenahermelindo Bebe is seen virtually today for medication management  She was last seen by this provider on 4/11/2023  She reports that she is taking her Seroquel and 2-3 Benadryl at night to sleep  She continues to have irritability and anger  She is falling asleep but not staying asleep  She is only sleeping approximately 2 hours per night  She denies any depression  She states that half of the week she is sad for \"no reason\"  She will cry on her drive home from work, and then it is over  She does not know why she is sad  She never has any anxiety  She is excited for her pregnancy and found out she is having a girl  States that her son is also excited for her sister  She reports that when she is irritable she yells at people, never hurts anyone or herself  She does not like being angry    She states that the Seroquel has helped a little, and understands that there are not a lot of options due to her " pregnancy  She reports that her OB/GYN is aware of her being on Seroquel and is okay with that  So far the baby is healthy during all evaluations  We will increase her Seroquel to 75 mg p o  at bedtime  She will call in a few weeks if this is effective  We will assess if we need to go to 100mg  She denies suicidal and homicidal ideations, denies any auditory or visual hallucinations  We will follow up in 1 month  She denies any side effects from current psychiatric medications  PLAN:  Increase Seroquel to 75 mg p o  at bedtime  Follow up in 1 month  She will call sooner with concerns or issues if they arise prior to scheduled appointment  She will continue to follow with her OB/GYN  Aware of 24 hour and weekend coverage for urgent situations accessed by calling [facility] main practice number  Referral for individual psychotherapy  Medication management every 1 month  Aware of need to follow up with family physician for medical issues    Diagnoses and all orders for this visit:    Bipolar 1 disorder ([address] )  -     QUEtiapine (SEROquel) 25 mg tablet; Take 3 tablets (75 mg total) by mouth daily at bedtime    PARQ completed including dizziness, sedation, GI distress, orthostatic hypotension and cardiovascular risks, metabolic syndrome, NMS, TD, EPS, Seizures, and others    She understands that she might be at increased risk for gestational diabetes, and the baby is at risk for low birth weight  Earnestine Six is a risk of abnormal muscle movements and withdrawal symptoms in the babies whose mothers took seroquel in the third trimester         Current Rating Scores:     Current PHQ-9   PHQ-2/9 Depression Screening    Little interest or pleasure in doing things: 0 - not at all  Feeling down, depressed, or hopeless: 0 - not at all  Trouble falling or staying asleep, or sleeping too much: 3 - nearly every day  Feeling tired or having little energy: 0 - not at all  Poor appetite or overeatin - not at all  Feeling bad about yourself - or that you are a failure or have let yourself or your family down: 0 - not at all  Trouble concentrating on things, such as reading the newspaper or watching television: 0 - not at all  Moving or speaking so slowly that other people could have noticed  Or the opposite - being so fidgety or restless that you have been moving around a lot more than usual: 0 - not at all  Thoughts that you would be better off dead, or of hurting yourself in some way: 0 - not at all  PHQ-9 Score: 3   PHQ-9 Interpretation: No or Minimal depression          Current Outpatient Medications on File Prior to Visit   Medication Sig Dispense Refill   • Prenatal Vit-Fe Fumarate-FA (PRENATAL VITAMIN PO) Take by mouth       No current facility-administered medications on file prior to visit  Psychotherapy Provided:     Individual psychotherapy provided: Yes  Counseling was provided during the session today for 16 minutes  Supportive counseling provided  Recent stressor including ongoing irritability discussed with Carmencita Blood  Coping strategies reviewed with Carmencita Blood  Importance of medication and treatment compliance reviewed with Carmencita Blood  Importance of follow up with family physician for medical issues reviewed with Carmencita Blood  Reassurance and supportive therapy provided  Crisis/safety plan discussed with Carmencita Blood  Patient will call prior to scheduled appointment if they have any issues or concerns  Patient understands they can access the office by calling the main number at any time if they are in crisis  They also understand they can call their [facility]'s crisis number or go to their nearest ED if suicidal ideation increases or if they develop a plan or intent  HPI ROS Appetite Changes and Sleep:     She reports decrease in number of sleep hours (2 hours), adequate appetite, normal energy level   Denies homicidal ideation, denies suicidal ideation    Review Of Systems:  HPI ROS: Medication Side Effects:  denies     Depression (10 worst): 0/10 (Was 0/10)   Anxiety (10 worst): 0/10 (Was 0/10)   Safety concerns (SI, HI, etc): denies (Was denies)   Sleep: 2 hours/night (Was 2 hrs/night)   Energy: adequate (Was adequate)   Appetite: good (Was good)     General normal    Personality no change in personality   Constitutional as noted in HPI   ENT negative   Cardiovascular negative   Respiratory negative   Gastrointestinal negative   Genitourinary negative   Musculoskeletal negative   Integumentary negative   Neurological negative   Endocrine negative   Other Symptoms pregnant status, all other systems are negative     Mental Status Evaluation:    Appearance Appropriately dressed and Good eye contact   Behavior calm and cooperative   Mood euthymic  Depression Scale - 0 of 10 (0 = No depression)  Anxiety Scale - 0 of 10 (0 = No anxiety)   Speech Normal rate and volume   Affect appropriate and mood-congruent   Thought Processes Goal directed and coherent   Thought Content Does not verbalize delusional material   Associations Tightly connected   Perceptual Disturbances Denies hallucinations and does not appear to be responding to internal stimuli   Risk Potential Suicidal/Homicidal Ideation - No evidence of suicidal or homicidal ideation and patient does not verbalize suicidal or homicidal ideation  Risk of Violence - No evidence of risk for violence found on assessment  Risk of Self Mutilation - No evidence of risk for self mutilation found on assessment   Orientation oriented to person, place, time/date and situation   Memory recent and remote memory grossly intact   Consciousness alert and awake   Attention/Concentration attention span and concentration are age appropriate   Insight intact   Judgement intact   Muscle Strength and Gait normal muscle strength and normal muscle tone, normal gait/station and normal balance   Motor Activity no abnormal movements   Language no difficulty naming common objects, no difficulty repeating a phrase, no difficulty writing a sentence   Fund of Knowledge adequate knowledge of current events  adequate fund of knowledge regarding past history  adequate fund of knowledge regarding vocabulary      Past Psychiatric History   Previous diagnoses include Anxiety and Depression   Prior outpatient psychiatric treatment: denies - received meds through PCP   Prior therapy: denies   Prior inpatient psychiatric treatment: [facility]  Casimiro Norwood suicide attempts: denies   Prior self harm: yes, pulling hair and scratching face   Cut wrist in 9th grade    Prior violence or aggression: yes, harrassment charges and on probation   Has anger issues      Past Psychiatric History Update:     Inpatient Psychiatric Admission Since Last Encounter:   no  Changes to Outpatient Psychiatric Treatment Team:    no  Suicide Attempt Or Self Mutilation Since Last Encounter:   no  Incidence of Violent Behavior Since Last Encounter:   no    Traumatic History Update:     New Onset of Abuse Since Last Encounter:   no  Traumatic Events Since Last Encounter:   no    Past Medical History:    Past Medical History:   Diagnosis Date   • Anxiety    • Depression     bipolar-serroquil   • Miscarriage     miscarriage 2022-no D & C, 1 termination 2017   • Self-injurious behavior    • Varicella     had vaccines        Past Surgical History:   Procedure Laterality Date   • MYRINGOTOMY W/ TUBES      age 3   • TEAR DUCT SURGERY  1998     Allergies   Allergen Reactions   • Dust Mite Extract Hives     Substance Abuse History:    Social History     Substance and Sexual Activity   Alcohol Use Not Currently    Comment: none with pregnancy     Social History     Substance and Sexual Activity   Drug Use Never    Comment: denies self or fiance- denies family hx except dad was alcoholic     Social History:    Social History     Socioeconomic History   • Marital status:      Spouse name: Not on file   • Number of children: 1   • Years of education: some college   • Highest education level: Some college, no degree   Occupational History     Employer: [employer]   Tobacco Use   • Smoking status: Never   • Smokeless tobacco: Never   Vaping Use   • Vaping Use: Never used   Substance and Sexual Activity   • Alcohol use: Not Currently     Comment: none with pregnancy   • Drug use: Never     Comment: denies self or fiance- denies family hx except dad was alcoholic   • Sexual activity: Yes     Partners: Male     Birth control/protection: None   Other Topics Concern   • Not on file   Social History Narrative   • Not on file     Social Determinants of Health     Financial Resource Strain: Not on file   Food Insecurity: Not on file   Transportation Needs: Not on file   Physical Activity: Not on file   Stress: Not on file   Social Connections: Not on file   Intimate Partner Violence: Not on file   Housing Stability: Not on file     Family Psychiatric History:     Family History   Problem Relation Age of Onset   • Supraventricular tachycardia Mother    • Ulcerative colitis Mother    • Anxiety disorder Father    • Atrial fibrillation Father    • Alcohol abuse Father    • No Known Problems Brother    • Colon polyps Maternal Grandfather    • Leukemia Paternal Grandmother    • Acute myelogenous leukemia Paternal Grandmother      History Review: The following portions of the patient's history were reviewed and updated as appropriate: allergies, current medications, past family history, past medical history, past social history, past surgical history and problem list     OBJECTIVE:     Vital signs in last 24 hours: There were no vitals filed for this visit    Laboratory Results:   Recent Labs (last 2 months):   Initial Prenatal on 04/26/2023   Component Date Value   • N gonorrhoeae, DNA Probe 04/26/2023 Negative    • Chlamydia trachomatis, D* 04/26/2023 Negative    • POCT URINE PROTEIN 04/26/2023 Negative    • GLUCOSE, UA 04/26/2023 Negative    Appointment on 04/24/2023   Component Date Value   • WBC 04/24/2023 7 02    • RBC 04/24/2023 4 70    • Hemoglobin 04/24/2023 13 5    • Hematocrit 04/24/2023 40 8    • MCV 04/24/2023 87    • MCH 04/24/2023 28 7    • MCHC 04/24/2023 33 1    • RDW 04/24/2023 12 6    • MPV 04/24/2023 10 9    • Platelets 01/80/1934 169    • nRBC 04/24/2023 0    • Neutrophils Relative 04/24/2023 66    • Immat GRANS % 04/24/2023 0    • Lymphocytes Relative 04/24/2023 24    • Monocytes Relative 04/24/2023 8    • Eosinophils Relative 04/24/2023 2    • Basophils Relative 04/24/2023 0    • Neutrophils Absolute 04/24/2023 4 64    • Immature Grans Absolute 04/24/2023 0 02    • Lymphocytes Absolute 04/24/2023 1 67    • Monocytes Absolute 04/24/2023 0 53    • Eosinophils Absolute 04/24/2023 0 13    • Basophils Absolute 04/24/2023 0 03    • Hepatitis B Surface Ag 04/24/2023 Non-reactive    • Hepatitis C Ab 04/24/2023 Non-reactive    • SYPHILIS TOTAL ANTIBODY 04/24/2023 Non-reactive    • Rubella IgG Quant 04/24/2023 68 2    • Color, UA 04/24/2023 Light Yellow    • Clarity, UA 04/24/2023 Clear    • Specific Gravity, UA 04/24/2023 1 022    • pH, UA 04/24/2023 7 0    • Leukocytes, UA 04/24/2023 Large (A)    • Nitrite, UA 04/24/2023 Negative    • Protein, UA 04/24/2023 Negative    • Glucose, UA 04/24/2023 Negative    • Ketones, UA 04/24/2023 Negative    • Urobilinogen, UA 04/24/2023 <2 0    • Bilirubin, UA 04/24/2023 Negative    • Occult Blood, UA 04/24/2023 Negative    • RBC, UA 04/24/2023 1-2    • WBC, UA 04/24/2023 4-10 (A)    • Epithelial Cells 04/24/2023 Occasional    • Bacteria, UA 04/24/2023 None Seen    • MUCUS THREADS 04/24/2023 Occasional (A)    • Urine Culture 04/24/2023 <10,000 cfu/ml Beta Hemolytic Streptococcus Group B (A)    • Urine Culture 04/24/2023 30,000-39,000 cfu/ml Diphtheroids    • HIV-1 p24 Antigen 04/24/2023 Non-Reactive    • HIV-1 Antibody 04/24/2023 Non-Reactive    • HIV-2 Antibody 04/24/2023 Non-Reactive    • HIV Ag-Ab 5th Gen 04/24/2023 Non-Reactive    • A  ALTERNATA 04/24/2023 <0 10    • A  FUMIGATUS 04/24/2023 <0 10    • [place] Grass 04/24/2023 <0 10    • Cecil  04/24/2023 <0 10    • Cat Epithellium-Dander 04/24/2023 <0 10    • C  HERBARUM 04/24/2023 <0 10    • Cockroach 04/24/2023 <0 10    • Common Silver Miguelito Beavers 04/24/2023 <0 10    • [place] 04/24/2023 <0 10    • D  farinae 04/24/2023 0 19 (H)    • D  pteronyssinus 04/24/2023 0 88 (H)    • Dog Dander 04/24/2023 <0 10    • Elm IgE 04/24/2023 <0 10    • [facility] Tree 04/24/2023 <0 10    • Mugwort 04/24/2023 <0 10    • [place] Tree 04/24/2023 <0 10    • Oak 04/24/2023 <0 10    • P CHRYSOGENUM 04/24/2023 <0 10    • Rough Pigweed  IgE 04/24/2023 <0 10    • Common Ragweed 04/24/2023 <0 10    • Sheep [place] IgE 04/24/2023 <0 10    • [place] Tree 04/24/2023 <0 10    • [place] Grass 04/24/2023 <0 10    • [place] Tree 04/24/2023 <0 10    • White [place] Tree 04/24/2023 <0 10    • IgE 04/24/2023 20 5    • MOUSE URINE 04/24/2023 <0 10    • Ferritin 04/24/2023 15    • Sodium 04/24/2023 137    • Potassium 04/24/2023 3 5    • Chloride 04/24/2023 104    • CO2 04/24/2023 26    • ANION GAP 04/24/2023 7    • BUN 04/24/2023 11    • Creatinine 04/24/2023 0 75    • Glucose, Fasting 04/24/2023 78    • Calcium 04/24/2023 9 7    • AST 04/24/2023 26    • ALT 04/24/2023 26    • Alkaline Phosphatase 04/24/2023 78    • Total Protein 04/24/2023 7 8    • Albumin 04/24/2023 4 5    • Total Bilirubin 04/24/2023 0 53    • eGFR 04/24/2023 109    • ABO Grouping 04/24/2023 O    • Rh Factor 04/24/2023 Positive    • Antibody Screen 04/24/2023 Negative    • Specimen Expiration Date 04/24/2023 [id number]      I have personally reviewed all pertinent laboratory/tests results      Suicide/Homicide Risk Assessment:    Risk of Harm to Self:  The following ratings are based on assessment at the time of the interview  Recent Specific Risk Factors include: ongoing irritability  Demographic risk factors include:   Historical Risk Factors include: chronic mood disorder  Protective Factors: no current suicidal ideation, access to mental health treatment, being a parent, being , compliant with medications, compliant with mental health treatment, having a desire to be alive, having a sense of purpose or meaning in life, responsibilities and duties to others, stable living environment, stable job, sense of determination, supportive family  Based on today's assessment, Steffany Roles presents the following risk of harm to self: low    Risk of Harm to Others: The following ratings are based on assessment at the time of the interview  Protective Factors: no current homicidal ideation  Based on today's assessment, Steffany Roles presents the following risk of harm to others: none    The following interventions are recommended: contracts for safety at present - agrees to go to ED if feeling unsafe, referral for psychotherapy, return in 1 month for reassessment, contracts for safety at present - agrees to call Crisis Intervention Service if feeling unsafe    Medications Risks/Benefits:      Risks, Benefits And Possible Side Effects Of Medications:    Discussed risks and benefits of treatment with patient including risk of parkinsonian symptoms, metabolic syndrome, tardive dyskinesia and neuroleptic malignant syndrome related to treatment with antipsychotic medications     Controlled Medication Discussion:     Not applicable    Treatment Plan:    Due for update/Updated:   no  Last treatment plan done 3/27/23 by PAOLA Mcdaniel  Treatment Plan due on 9/27/23  PAOLA Mittal 05/22/23    This note was shared with patient

## 2023-06-01 ENCOUNTER — ROUTINE PRENATAL (OUTPATIENT)
Age: 28
End: 2023-06-01

## 2023-06-01 ENCOUNTER — APPOINTMENT (OUTPATIENT)
Age: 28
End: 2023-06-01
Payer: COMMERCIAL

## 2023-06-01 VITALS
DIASTOLIC BLOOD PRESSURE: 82 MMHG | SYSTOLIC BLOOD PRESSURE: 110 MMHG | HEART RATE: 76 BPM | BODY MASS INDEX: 23.34 KG/M2 | WEIGHT: 136 LBS

## 2023-06-01 DIAGNOSIS — Z3A.12 12 WEEKS GESTATION OF PREGNANCY: Primary | ICD-10-CM

## 2023-06-01 DIAGNOSIS — Z36.9 ANTENATAL SCREENING ENCOUNTER: ICD-10-CM

## 2023-06-01 LAB
SL AMB  POCT GLUCOSE, UA: NORMAL
SL AMB POCT URINE PROTEIN: NORMAL

## 2023-06-01 PROCEDURE — 82105 ALPHA-FETOPROTEIN SERUM: CPT

## 2023-06-01 PROCEDURE — 36415 COLL VENOUS BLD VENIPUNCTURE: CPT

## 2023-06-01 NOTE — ASSESSMENT & PLAN NOTE
Here with FOB  Feels well  Denies LOF/CTX/VB  Discussed fetal awareness  No concerns  Will do msAFP today  Advised to increase fluids on the warmer days

## 2023-06-01 NOTE — PROGRESS NOTES
Problem   12 Weeks Gestation of Pregnancy    Blood Type: O positive  Antibody negative  Pap 04/21/2021 neg  GC/CT -  neg  PN1 Labs- wnl   28 Week Labs-  COVID vaccine- x2  Flu vaccine - completed  Blue folder- reviewed    Genetic screening- NIPS low  AFP- ordered  Level 1- 5/08  Level 2- 6/27    Yellow folder-  TDAP -   Delivery consent-  Breast pump -   Pediatrician -    Perineal massage -  GBS swab -   IOL -       12 weeks gestation of pregnancy  Here with FOB  Feels well  Denies LOF/CTX/VB  Discussed fetal awareness  No concerns  Will do msAFP today  Advised to increase fluids on the warmer days

## 2023-06-01 NOTE — PATIENT INSTRUCTIONS
Pregnancy at 15 to 18 Weeks   AMBULATORY CARE:   What changes are happening to your body:  Now that you are in your second trimester, you have more energy  You may also feel hungrier than usual  You may start to experience other symptoms, such as heartburn or dizziness  You may be gaining about ½ to 1 pound a week, and your pregnancy is beginning to show  You may need to start wearing maternity clothes  Seek care immediately if:   You have pain or cramping in your abdomen or low back  You have heavy vaginal bleeding or clotting  You pass material that looks like tissue or large clots  Collect the material and bring it with you  Call your doctor or obstetrician if:   You cannot keep food or drinks down, and you are losing weight  You have light bleeding  You have chills or a fever  You have vaginal itching, burning, or pain  You have yellow, green, white, or foul-smelling vaginal discharge  You have pain or burning when you urinate, less urine than usual, or pink or bloody urine  You have questions or concerns about your condition or care  How to care for yourself at this stage of your pregnancy:       Manage heartburn  by eating 4 or 5 small meals each day instead of large meals  Avoid spicy foods  Avoid eating right before bedtime  Manage nausea and vomiting  Avoid fatty and spicy foods  Eat small meals throughout the day instead of large meals  Madelyn may help to decrease nausea  Ask your healthcare provider about other ways of decreasing nausea and vomiting  Eat a variety of healthy foods  Healthy foods include fruits, vegetables, whole-grain breads, low-fat dairy foods, beans, lean meats, and fish  Drink liquids as directed  Ask how much liquid to drink each day and which liquids are best for you  Limit caffeine to less than 200 milligrams each day  Limit your intake of fish to 2 servings each week   Choose fish low in mercury such as canned light tuna, shrimp, salmon, cod, or tilapia  Do not  eat fish high in mercury such as swordfish, tilefish, rigoberto mackerel, and shark  Take prenatal vitamins as directed  Your need for certain vitamins and minerals, such as folic acid, increases during pregnancy  Prenatal vitamins provide some of the extra vitamins and minerals you need  Prenatal vitamins may also help to decrease the risk of certain birth defects  Do not smoke  Smoking increases your risk of a miscarriage and other health problems during your pregnancy  Smoking can cause your baby to be born too early or weigh less at birth  Ask your healthcare provider for information if you need help quitting  Do not drink alcohol  Alcohol passes from your body to your baby through the placenta  It can affect your baby's brain development and cause fetal alcohol syndrome (FAS)  FAS is a group of conditions that causes mental, behavior, and growth problems  Talk to your healthcare provider before you take any medicines  Many medicines may harm your baby if you take them when you are pregnant  Do not take any medicines, vitamins, herbs, or supplements without first talking to your healthcare provider  Never use illegal or street drugs (such as marijuana or cocaine) while you are pregnant  Safety tips during pregnancy:   Avoid hot tubs and saunas  Do not use a hot tub or sauna while you are pregnant, especially during your first trimester  Hot tubs and saunas may raise your baby's temperature and increase the risk of birth defects  Avoid toxoplasmosis  This is an infection caused by eating raw meat or being around infected cat feces  It can cause birth defects, miscarriages, and other problems  Wash your hands after you touch raw meat  Make sure any meat is well-cooked before you eat it  Avoid raw eggs and unpasteurized milk  Use gloves or ask someone else to clean your cat's litter box while you are pregnant      Changes that are happening with your baby:  By 56 weeks, your baby may be about 6 inches long from the top of the head to the rump (baby's bottom)  Your baby may weigh about 11 ounces  You may be able to feel your baby's movement at about 18 weeks or later  The first movements may not be that noticeable  They may feel like a fluttering sensation  Your baby also makes sucking movements and can hear certain sounds  What you need to know about prenatal care:  During the first 28 weeks of your pregnancy, you will see your healthcare provider once a month  Your healthcare provider will check your blood pressure and weight  You may also need any of the following:  A urine test  may also be done to check for sugar and protein  These can be signs of gestational diabetes or infection  A blood test  may be done to check for anemia (low iron level)  Fundal height check  is a measurement of your uterus to check your baby's growth  This number is usually the same as the number of weeks that you have been pregnant  An ultrasound  may be done to check your baby's development  Your healthcare provider may be able to tell you what your baby's gender is during the ultrasound  Your baby's heart rate  will be checked  © Copyright [employer] 2022 Information is for End User's use only and may not be sold, redistributed or otherwise used for commercial purposes  The above information is an  only  It is not intended as medical advice for individual conditions or treatments  Talk to your doctor, nurse or pharmacist before following any medical regimen to see if it is safe and effective for you

## 2023-06-03 LAB
2ND TRIMESTER 4 SCREEN SERPL-IMP: NORMAL
AFP ADJ MOM SERPL: 1.13
AFP INTERP AMN-IMP: NORMAL
AFP INTERP SERPL-IMP: NORMAL
AFP INTERP SERPL-IMP: NORMAL
AFP SERPL-MCNC: 50.2 NG/ML
AGE AT DELIVERY: 28 YR
GA METHOD: NORMAL
GA: 17.4 WEEKS
IDDM PATIENT QL: NO
MULTIPLE PREGNANCY: NO
NEURAL TUBE DEFECT RISK FETUS: 8106 %

## 2023-06-07 ENCOUNTER — TELEPHONE (OUTPATIENT)
Dept: OBGYN CLINIC | Facility: CLINIC | Age: 28
End: 2023-06-07

## 2023-06-07 NOTE — TELEPHONE ENCOUNTER
Pt is pregnant and wants to know if she should wear a mask because of the current air quality  Please call pt back to let her know

## 2023-06-07 NOTE — TELEPHONE ENCOUNTER
Advised patient to stay indoors with good ventilation and air flow  If possible and if she has one use an air purifier  Message sent to on call provider already regarding this situation- awaiting response  Will call patient if there is further advisement  Advised patient to call back if having any nausea dizziness or sob

## 2023-06-20 ENCOUNTER — TELEMEDICINE (OUTPATIENT)
Dept: PSYCHIATRY | Facility: CLINIC | Age: 28
End: 2023-06-20
Payer: COMMERCIAL

## 2023-06-20 DIAGNOSIS — F31.9 BIPOLAR 1 DISORDER (HCC): ICD-10-CM

## 2023-06-20 PROCEDURE — 99214 OFFICE O/P EST MOD 30 MIN: CPT | Performed by: NURSE PRACTITIONER

## 2023-06-20 RX ORDER — QUETIAPINE FUMARATE 25 MG/1
75 TABLET, FILM COATED ORAL
Qty: 90 TABLET | Refills: 2 | Status: SHIPPED | OUTPATIENT
Start: 2023-06-20

## 2023-06-20 NOTE — PSYCH
Virtual Regular Visit    Verification of patient location:    Patient is located in the following state in which I hold an active license [place]    Problem List Items Addressed This Visit        Other    Bipolar 1 disorder ([address] )    Relevant Medications    QUEtiapine (SEROquel) 25 mg tablet          Encounter provider PAOLA Alba    Provider located at    [address] [place] [id number] [phone]    Recent Visits  Date Type Provider Dept   06/20/23 Lindsey Riojas [address], [address] recent visits within past 7 days and meeting all other requirements  Future Appointments  No visits were found meeting these conditions  Showing future appointments within next 150 days and meeting all other requirements           The patient was identified by name and date of birth  Patient was informed that this is a telemedicine visit and that [facility]  She agrees to proceed     My office door was closed  No one else was in the room  She acknowledged consent and understanding of privacy and security of the video platform  The patient has agreed to participate and understands they can discontinue the visit at any time  Patient is aware this is a billable service  HPI     Current Outpatient Medications   Medication Sig Dispense Refill   • Prenatal Vit-Fe Fumarate-FA (PRENATAL VITAMIN PO) Take by mouth     • QUEtiapine (SEROquel) 25 mg tablet Take 3 tablets (75 mg total) by mouth daily at bedtime 90 tablet 2     No current facility-administered medications for this visit  Review of Systems  Video Exam    There were no vitals filed for this visit  Physical Exam   As a result of this visit, I have referred the patient for further respiratory evaluation   No    I spent 25 minutes directly with the patient during this visit  VIRTUAL VISIT DISCLAIMER    Cesia Chan acknowledges that she has consented to an online visit or consultation  She understands that the online visit is based solely on information provided by her, and that, in the absence of a face-to-face physical evaluation by the physician, the diagnosis she receives is both limited and provisional in terms of accuracy and completeness  This is not intended to replace a full medical face-to-face evaluation by the physician  Cesia Chan understands and accepts these terms  MEDICATION MANAGEMENT NOTE          [facility]    Name and Date of Birth:  Cesia Chan 32 y o  1995 MRN: [id number]    Date of Visit: June 20, 2023    Allergies   Allergen Reactions   • Dust Mite Extract Hives       Visit Time    Visit Start Time: 0800  Visit Stop Time: 0825  Total Visit Duration: 25 minutes    SUBJECTIVE:    Bonifacio Moran is seen today for a follow up for Bipolar Disorder  She continues to improve slowly since the last visit  Lemhible Ganser was seen virtually today for medication management follow-up  She reports that the 75 mg of Seroquel has been working for her sleep  She gets approximately 6 to 7 hours per night uninterrupted sleep which is a huge improvement  However, she wakes up angry  She states that sometimes she is aggressive, but never hurts anyone  She states random things will set her off and they can be irrational   She has done anger management which has been ineffective  She has been eating well, the baby is healthy and due somewhere between November 8 and 14  Her son is doing well  She continues to work which is going fine  She denies depression, denies anxiety, just really wants help with the anger  She denies suicidal and homicidal ideation or auditory and visual hallucinations  This provider will again email regarding therapy to find out where she is on the wait list at this time    She understands that medication options are somewhat limited due to her pregnancy  She understands risks of Seroquel during pregnancy  We will follow up in 1 month  PARQ completed including dizziness, sedation, GI distress, orthostatic hypotension and cardiovascular risks, metabolic syndrome, NMS, TD, EPS, Seizures, and others   She understands that she might be at increased risk for gestational diabetes, and the baby is at risk for low birth weight  Anant Overcast is a risk of abnormal muscle movements and withdrawal symptoms in the babies whose mothers took seroquel in the third trimester       She denies any side effects from current psychiatric medications  PLAN:  Continue Seroquel 75mg PO HS    Aware of 24 hour and weekend coverage for urgent situations accessed by calling [facility] main practice number  Referral for individual psychotherapy  Medication management every 1 month  Aware of need to follow up with family physician for medical issues    Diagnoses and all orders for this visit:    Bipolar 1 disorder ([facility] [address] )  -     QUEtiapine (SEROquel) 25 mg tablet; Take 3 tablets (75 mg total) by mouth daily at bedtime        Current Rating Scores:     Current PHQ-9   PHQ-2/9 Depression Screening    Little interest or pleasure in doing things: 0 - not at all  Feeling down, depressed, or hopeless: 0 - not at all  Trouble falling or staying asleep, or sleeping too much: 0 - not at all  Feeling tired or having little energy: 1 - several days  Poor appetite or overeatin - not at all  Feeling bad about yourself - or that you are a failure or have let yourself or your family down: 0 - not at all  Trouble concentrating on things, such as reading the newspaper or watching television: 0 - not at all  Moving or speaking so slowly that other people could have noticed   Or the opposite - being so fidgety or restless that you have been moving around a lot more than usual: 0 - not at all  Thoughts that you would be better off dead, or of hurting yourself in some way: 0 - not at all  PHQ-9 Score: 1   PHQ-9 Interpretation: No or Minimal depression          Current Outpatient Medications on File Prior to Visit   Medication Sig Dispense Refill   • Prenatal Vit-Fe Fumarate-FA (PRENATAL VITAMIN PO) Take by mouth       No current facility-administered medications on file prior to visit  Psychotherapy Provided:     Individual psychotherapy provided: Yes  Counseling was provided during the session today for 16 minutes  Supportive counseling provided  Medications, treatment progress and treatment plan reviewed with Giuseppe Yeung  Medication changes discussed with Giuseppe Yeung  Medication education provided to Giuseppe Yeung  Discussed with Giuseppe Yeung coping with difficulty with anger management  Coping strategies reviewed with Giuseppe Yeung  Importance of medication and treatment compliance reviewed with Giuseppe Yeung  Importance of follow up with family physician for medical issues reviewed with Giuseppe Yeung  Reassurance and supportive therapy provided  Crisis/safety plan discussed with Giuseppe Yeung  Patient will call prior to scheduled appointment if they have any issues or concerns  Patient understands they can access the office by calling the main number at any time if they are in crisis  They also understand they can call their [facility]'s crisis number or go to their nearest ED if suicidal ideation increases or if they develop a plan or intent  HPI ROS Appetite Changes and Sleep:     She reports normal sleep, normal appetite, normal energy level   Denies homicidal ideation, denies suicidal ideation    Review Of Systems:      HPI ROS:               Medication Side Effects:  denies     Depression (10 worst): 0/10 (Was 0/10)   Anxiety (10 worst): 0/10 (Was 0/10)   Safety concerns (SI, HI, etc): denies (Was denies)   Sleep: 6 hours/night (Was 2 hours/night)   Energy: adequate (Was adequate)   Appetite: good (Was good)     General normal    Personality no change in personality Constitutional negative   ENT negative   Cardiovascular negative   Respiratory negative   Gastrointestinal negative   Genitourinary negative   Musculoskeletal negative   Integumentary negative   Neurological negative   Endocrine negative   Other Symptoms none, pregnancy positive     Mental Status Evaluation:    Appearance Appropriately dressed and Good eye contact   Behavior calm and cooperative   Mood euthymic  Depression Scale - 0 of 10 (0 = No depression)  Anxiety Scale - 0 of 10 (0 = No anxiety)   Speech Normal rate and volume   Affect mood-congruent   Thought Processes Goal directed and coherent   Thought Content Does not verbalize delusional material   Associations Tightly connected   Perceptual Disturbances Denies hallucinations and does not appear to be responding to internal stimuli   Risk Potential Suicidal/Homicidal Ideation - No evidence of suicidal or homicidal ideation and patient does not verbalize suicidal or homicidal ideation  Risk of Violence - No evidence of risk for violence found on assessment  Risk of Self Mutilation - No evidence of risk for self mutilation found on assessment   Orientation oriented to person, place, time/date and situation   Memory recent and remote memory grossly intact   Consciousness alert and awake   Attention/Concentration attention span and concentration are age appropriate   Insight fair   Judgement fair   Muscle Strength and Gait normal muscle strength and normal muscle tone, normal gait/station and normal balance   Motor Activity no abnormal movements   Language no difficulty naming common objects, no difficulty repeating a phrase, no difficulty writing a sentence   Fund of Knowledge adequate knowledge of current events  adequate fund of knowledge regarding past history  adequate fund of knowledge regarding vocabulary      Past Psychiatric History   Previous diagnoses include Anxiety and Depression   Prior outpatient psychiatric treatment: denies - received meds through PCP   Prior therapy: denies   Prior inpatient psychiatric treatment: denies  Alexa Wilson suicide attempts: denies   Prior self harm: yes, pulling hair and scratching face   Cut wrist in 9th grade    Prior violence or aggression: yes, harrassment charges and on probation   Has anger issues      Past Psychiatric History Update:     Inpatient Psychiatric Admission Since Last Encounter:   no  Changes to Outpatient Psychiatric Treatment Team:    no  Suicide Attempt Or Self Mutilation Since Last Encounter:   no  Incidence of Violent Behavior Since Last Encounter:   no    Traumatic History Update:     New Onset of Abuse Since Last Encounter:   no  Traumatic Events Since Last Encounter:   no    Past Medical History:    Past Medical History:   Diagnosis Date   • Anxiety    • Depression     bipolar-serroquil   • Miscarriage     miscarriage 2022-no D & C, 1 termination 2017   • Self-injurious behavior    • Varicella     had vaccines        Past Surgical History:   Procedure Laterality Date   • MYRINGOTOMY W/ TUBES      age 3   • TEAR DUCT SURGERY  1998     Allergies   Allergen Reactions   • Dust Mite Extract Hives     Substance Abuse History:    Social History     Substance and Sexual Activity   Alcohol Use Not Currently    Comment: none with pregnancy     Social History     Substance and Sexual Activity   Drug Use Never    Comment: denies self or fiance- denies family hx except dad was alcoholic     Social History:    Social History     Socioeconomic History   • Marital status:      Spouse name: Not on file   • Number of children: 1   • Years of education: some college   • Highest education level: Some college, no degree   Occupational History     Employer: [employer]   Tobacco Use   • Smoking status: Never   • Smokeless tobacco: Never   Vaping Use   • Vaping Use: Never used   Substance and Sexual Activity   • Alcohol use: Not Currently     Comment: none with pregnancy   • Drug use: Never     Comment: denies self or fiance- denies family hx except dad was alcoholic   • Sexual activity: Yes     Partners: Male     Birth control/protection: None   Other Topics Concern   • Not on file   Social History Narrative   • Not on file     Social Determinants of Health     Financial Resource Strain: Not on file   Food Insecurity: Not on file   Transportation Needs: Not on file   Physical Activity: Inactive (9/21/2021)    Exercise Vital Sign    • Days of Exercise per Week: 0 days    • Minutes of Exercise per Session: 0 min   Stress: Stress Concern Present (9/21/2021)    [address]    • Feeling of Stress : Very much   Social Connections: Not on file   Intimate Partner Violence: Not on file   Housing Stability: Not on file     Family Psychiatric History:     Family History   Problem Relation Age of Onset   • Supraventricular tachycardia Mother    • Ulcerative colitis Mother    • Anxiety disorder Father    • Atrial fibrillation Father    • Alcohol abuse Father    • No Known Problems Brother    • Colon polyps Maternal Grandfather    • Leukemia Paternal Grandmother    • Acute myelogenous leukemia Paternal Grandmother      History Review: The following portions of the patient's history were reviewed and updated as appropriate: allergies, current medications, past family history, past medical history, past social history, past surgical history and problem list     OBJECTIVE:     Vital signs in last 24 hours: There were no vitals filed for this visit  Laboratory Results:   Recent Labs (last 2 months):   Appointment on 06/01/2023   Component Date Value   • Results 06/01/2023 Report    • TEST RESULTS (AFP) 06/01/2023 *Screen Negative*    • Gestational Age 06/01/2023 17 4    • Gestat  Age Based On 06/01/2023 LMP    • Maternal Age At EBENEZER 06/01/2023 28 0    • Race 06/01/2023     • Weight 06/01/2023 135    • Insulin Dep   Diabetic 06/01/2023 No    • Multiple Gestation 06/01/2023 No    • AFP 06/01/2023 50 2    • MSAFP Mom 06/01/2023 1 13    • Osb Risk 06/01/2023 8106    • MSAFP Interp 06/01/2023 Comment    • AF, AFP Comments 06/01/2023 Comment    Routine Prenatal on 06/01/2023   Component Date Value   • POCT URINE PROTEIN 06/01/2023 neg    • GLUCOSE, UA 06/01/2023 neg    Initial Prenatal on 04/26/2023   Component Date Value   • N gonorrhoeae, DNA Probe 04/26/2023 Negative    • Chlamydia trachomatis, D* 04/26/2023 Negative    • POCT URINE PROTEIN 04/26/2023 Negative    • GLUCOSE, UA 04/26/2023 Negative    Appointment on 04/24/2023   Component Date Value   • WBC 04/24/2023 7 02    • RBC 04/24/2023 4 70    • Hemoglobin 04/24/2023 13 5    • Hematocrit 04/24/2023 40 8    • MCV 04/24/2023 87    • MCH 04/24/2023 28 7    • MCHC 04/24/2023 33 1    • RDW 04/24/2023 12 6    • MPV 04/24/2023 10 9    • Platelets 06/35/0333 169    • nRBC 04/24/2023 0    • Neutrophils Relative 04/24/2023 66    • Immat GRANS % 04/24/2023 0    • Lymphocytes Relative 04/24/2023 24    • Monocytes Relative 04/24/2023 8    • Eosinophils Relative 04/24/2023 2    • Basophils Relative 04/24/2023 0    • Neutrophils Absolute 04/24/2023 4 64    • Immature Grans Absolute 04/24/2023 0 02    • Lymphocytes Absolute 04/24/2023 1 67    • Monocytes Absolute 04/24/2023 0 53    • Eosinophils Absolute 04/24/2023 0 13    • Basophils Absolute 04/24/2023 0 03    • Hepatitis B Surface Ag 04/24/2023 Non-reactive    • Hepatitis C Ab 04/24/2023 Non-reactive    • Syphilis Total Antibody 04/24/2023 Non-reactive    • Rubella IgG Quant 04/24/2023 68 2    • Color, UA 04/24/2023 Light Yellow    • Clarity, UA 04/24/2023 Clear    • Specific Gravity, UA 04/24/2023 1 022    • pH, UA 04/24/2023 7 0    • Leukocytes, UA 04/24/2023 Large (A)    • Nitrite, UA 04/24/2023 Negative    • Protein, UA 04/24/2023 Negative    • Glucose, UA 04/24/2023 Negative    • Ketones, UA 04/24/2023 Negative    • Urobilinogen, UA 04/24/2023 <2 0    • Bilirubin, UA 04/24/2023 Negative    • Occult Blood, UA 04/24/2023 Negative    • RBC, UA 04/24/2023 1-2    • WBC, UA 04/24/2023 4-10 (A)    • Epithelial Cells 04/24/2023 Occasional    • Bacteria, UA 04/24/2023 None Seen    • MUCUS THREADS 04/24/2023 Occasional (A)    • Urine Culture 04/24/2023 <10,000 cfu/ml Beta Hemolytic Streptococcus Group B (A)    • Urine Culture 04/24/2023 30,000-39,000 cfu/ml Diphtheroids    • HIV-1 p24 Antigen 04/24/2023 Non-Reactive    • HIV-1 Antibody 04/24/2023 Non-Reactive    • HIV-2 Antibody 04/24/2023 Non-Reactive    • HIV Ag-Ab 5th Gen 04/24/2023 Non-Reactive    • A  ALTERNATA 04/24/2023 <0 10    • A  FUMIGATUS 04/24/2023 <0 10    • [place] Grass 04/24/2023 <0 10    • [place]  04/24/2023 <0 10    • Cat Epithellium-Dander 04/24/2023 <0 10    • C  HERBARUM 04/24/2023 <0 10    • Cockroach 04/24/2023 <0 10    • Common Silver Earney Arizmendi 04/24/2023 <0 10    • [place] 04/24/2023 <0 10    • D  farinae 04/24/2023 0 19 (H)    • D  pteronyssinus 04/24/2023 0 88 (H)    • Dog Dander 04/24/2023 <0 10    • Elm IgE 04/24/2023 <0 10    • [facility] Tree 04/24/2023 <0 10    • Mugwort 04/24/2023 <0 10    • [place] Tree 04/24/2023 <0 10    • Oak 04/24/2023 <0 10    • P CHRYSOGENUM 04/24/2023 <0 10    • Rough Pigweed  IgE 04/24/2023 <0 10    • Common Ragweed 04/24/2023 <0 10    • Sheep [place] IgE 04/24/2023 <0 10    • [place] Tree 04/24/2023 <0 10    • [place] Grass 04/24/2023 <0 10    • [address] Tree 04/24/2023 <0 10    • White Alejandro Tree 04/24/2023 <0 10    • IgE 04/24/2023 20 5    • MOUSE URINE 04/24/2023 <0 10    • Ferritin 04/24/2023 15    • Sodium 04/24/2023 137    • Potassium 04/24/2023 3 5    • Chloride 04/24/2023 104    • CO2 04/24/2023 26    • ANION GAP 04/24/2023 7    • BUN 04/24/2023 11    • Creatinine 04/24/2023 0 75    • Glucose, Fasting 04/24/2023 78    • Calcium 04/24/2023 9 7    • AST 04/24/2023 26    • ALT 04/24/2023 26    • Alkaline Phosphatase 04/24/2023 78    • Total Protein 04/24/2023 7 8    • Albumin 04/24/2023 4 5    • Total Bilirubin 04/24/2023 0 53    • eGFR 04/24/2023 109    • ABO Grouping 04/24/2023 O    • Rh Factor 04/24/2023 Positive    • Antibody Screen 04/24/2023 Negative    • Specimen Expiration Date 04/24/2023 [id number]      I have personally reviewed all pertinent laboratory/tests results  Suicide/Homicide Risk Assessment:    Risk of Harm to Self:  The following ratings are based on assessment at the time of the interview  Recent Specific Risk Factors include: current anxiety symptoms, chronic anger  Demographic risk factors include:   Historical Risk Factors include: history of mood disorder, history of impulsive behaviors, history of traumatic experiences, history of legal problems  Protective Factors: no current suicidal ideation, access to mental health treatment, being a parent, compliant with medications, compliant with mental health treatment, having a desire to be alive, responsibilities and duties to others, stable job, strong relationships, supportive family  Based on today's assessment, Sudhakar English presents the following risk of harm to self: low    Risk of Harm to Others:   The following ratings are based on assessment at the time of the interview  Protective Factors: no current homicidal ideation  Based on today's assessment, Sudhakar Blancadino presents the following risk of harm to others: none    The following interventions are recommended: contracts for safety at present - agrees to go to ED if feeling unsafe, referral for psychotherapy, return in 1 month for reassessment, contracts for safety at present - agrees to call Crisis Intervention Service if feeling unsafe    Medications Risks/Benefits:      Risks, Benefits And Possible Side Effects Of Medications:    Discussed risks and benefits of treatment with patient including risk of parkinsonian symptoms, metabolic syndrome, tardive dyskinesia and neuroleptic malignant syndrome related to treatment with antipsychotic medications Controlled Medication Discussion:     Not applicable    Treatment Plan:    Due for update/Updated:   no  Last treatment plan done 3/27/23 by PAOLA Eli  Treatment Plan due on 9/27/23  PAOLA Herrera 06/23/23    This note was shared with patient

## 2023-06-27 ENCOUNTER — ROUTINE PRENATAL (OUTPATIENT)
Dept: PERINATAL CARE | Facility: OTHER | Age: 28
End: 2023-06-27
Payer: COMMERCIAL

## 2023-06-27 VITALS
DIASTOLIC BLOOD PRESSURE: 60 MMHG | SYSTOLIC BLOOD PRESSURE: 90 MMHG | BODY MASS INDEX: 24.17 KG/M2 | HEIGHT: 64 IN | HEART RATE: 77 BPM | WEIGHT: 141.6 LBS

## 2023-06-27 DIAGNOSIS — Z36.86 ENCOUNTER FOR ANTENATAL SCREENING FOR CERVICAL LENGTH: ICD-10-CM

## 2023-06-27 DIAGNOSIS — Z3A.20 20 WEEKS GESTATION OF PREGNANCY: ICD-10-CM

## 2023-06-27 DIAGNOSIS — Z36.3 ENCOUNTER FOR ANTENATAL SCREENING FOR MALFORMATION: Primary | ICD-10-CM

## 2023-06-27 PROCEDURE — 76805 OB US >/= 14 WKS SNGL FETUS: CPT | Performed by: OBSTETRICS & GYNECOLOGY

## 2023-06-27 PROCEDURE — 76817 TRANSVAGINAL US OBSTETRIC: CPT | Performed by: OBSTETRICS & GYNECOLOGY

## 2023-06-27 NOTE — PROGRESS NOTES
Ashley Wharton  has no complaints today at 20w0d  She reports fetal movements and does not report any vaginal bleeding or signs of labor  Her recently completed fetal testing revealed a normal NIPT and MSAFP  She is here today for a anatomical survey of her fetus  Problem list:  1  Anxiety, depression, bipolar currently controlled on Seroquel 75 mg daily    Ultrasound findings: The ultrasound today shows normal interval fetal growth and fluid, normal cervical length, and no malformations were detected  The anatomical survey was not completed secondary to fetal position  Pregnancy ultrasound has limitations and is unable to detect all forms of fetal congenital abnormalities  Follow up recommended:   1  Recommend a follow-up ultrasound for missed anatomy in 2 to 4 weeks      Abida Duenas MD

## 2023-06-27 NOTE — LETTER
June 27, 2023     [facility]  [address]    Patient: Fly Ross   YOB: 1995   Date of Visit: 6/27/2023       Dear Dr Arsenio Mckeon: Thank you for referring Fly Ross to me for evaluation  Below are my notes for this consultation  If you have questions, please do not hesitate to call me  I look forward to following your patient along with you  Sincerely,        Elio Bond MD        CC: No Recipients    Elio Bond MD  6/27/2023 11:55 AM  Sign when Signing Visit  Fly Ross  has no complaints today at 20w0d  She reports fetal movements and does not report any vaginal bleeding or signs of labor  Her recently completed fetal testing revealed a normal NIPT and MSAFP  She is here today for a anatomical survey of her fetus  Problem list:  1  Anxiety, depression, bipolar currently controlled on Seroquel 75 mg daily    Ultrasound findings: The ultrasound today shows normal interval fetal growth and fluid, normal cervical length, and no malformations were detected  The anatomical survey was not completed secondary to fetal position  Pregnancy ultrasound has limitations and is unable to detect all forms of fetal congenital abnormalities  Follow up recommended:   1  Recommend a follow-up ultrasound for missed anatomy in 2 to 4 weeks      Elio Bond MD

## 2023-07-03 ENCOUNTER — ROUTINE PRENATAL (OUTPATIENT)
Age: 28
End: 2023-07-03
Payer: COMMERCIAL

## 2023-07-03 VITALS
BODY MASS INDEX: 24.24 KG/M2 | HEIGHT: 64 IN | SYSTOLIC BLOOD PRESSURE: 98 MMHG | WEIGHT: 142 LBS | DIASTOLIC BLOOD PRESSURE: 60 MMHG

## 2023-07-03 DIAGNOSIS — Z3A.20 20 WEEKS GESTATION OF PREGNANCY: Primary | ICD-10-CM

## 2023-07-03 LAB
SL AMB  POCT GLUCOSE, UA: NEGATIVE
SL AMB POCT URINE PROTEIN: NORMAL

## 2023-07-03 PROCEDURE — 81002 URINALYSIS NONAUTO W/O SCOPE: CPT

## 2023-07-03 PROCEDURE — 99213 OFFICE O/P EST LOW 20 MIN: CPT

## 2023-07-03 NOTE — ASSESSMENT & PLAN NOTE
Sunil Yancey is a 32 y.o. [id number] 20w6d here for routine prenatal care. Prepregnancy BMI 21.10 with a goal weight gain 11.5 kg (25 lb)-16 kg (35 lb). TWG 8.618 kg (19 lb)  Feels well. Denies LOF/CTX/VB. +FM. No concerns. Fetal growth scan scheduled  for missed anatomy.  labor precautions reviewed. Encouraged adequate hydration and nutrition  Pregnancy Essential guide and Baby and Me center web site recommended.

## 2023-07-03 NOTE — PROGRESS NOTES
Patient is here for prenatal ob visit today. No question's or concerns at this time. GA: 20w6d  Estimated Date of Delivery: 11/14/23  [id number]  Urine: Protein trace / Glucose negative  Denies loss of fluid, vaginal bleeding and contractions. Good fetal movement. Labs are completed and up to date.

## 2023-07-03 NOTE — PROGRESS NOTES
Problem   20 Weeks Gestation of Pregnancy    Blood Type: O positive. Antibody negative  Pap 2021 neg  GC/CT -  neg  PN1 Labs- wnl   28 Week Labs-  COVID vaccine- x2  Flu vaccine - completed  Blue folder- reviewed    Genetic screening- NIPS low  AFP- ordered  Level 1-   Level 2-     Yellow folder-  TDAP -   Delivery consent-  Breast pump -   Pediatrician -    Perineal massage -  GBS swab -   IOL -       20 weeks gestation of pregnancy  Marquis Tinoco is a 32 y.o. [id number] 20w6d here for routine prenatal care. Prepregnancy BMI 21.10 with a goal weight gain 11.5 kg (25 lb)-16 kg (35 lb). TWG 8.618 kg (19 lb)  Feels well. Denies LOF/CTX/VB. +FM. No concerns. Fetal growth scan scheduled  for missed anatomy.  labor precautions reviewed. Encouraged adequate hydration and nutrition  Pregnancy Essential guide and Baby and Me center web site recommended.

## 2023-07-03 NOTE — PATIENT INSTRUCTIONS
Pregnancy at 23 to 22 Weeks   AMBULATORY CARE:   What changes are happening with your body:  Now that you are in your second trimester, you have more energy. You may also be feeling hungrier than usual. You may be gaining about ½ to 1 pound a week, and your pregnancy is beginning to show. You may need to start wearing maternity clothes. As your baby gets larger, you may have other symptoms. These may include body aches or stretch marks on your abdomen, breasts, thighs, or buttocks. Seek care immediately if:   You develop a severe headache that does not go away. You have new or increased vision changes, such as blurred or spotted vision. You have new or increased swelling in your face or hands. You have vaginal spotting or bleeding. Your water broke or you feel warm water gushing or trickling from your vagina. Call your doctor or obstetrician if:   You have abdominal cramps, pressure, or tightening. You have a change in vaginal discharge. You cannot keep food or drinks down, and you are losing weight. You have chills or a fever. You have vaginal itching, burning, or pain. You have yellow, green, white, or foul-smelling vaginal discharge. You have pain or burning when you urinate, less urine than usual, or pink or bloody urine. You have questions or concerns about your condition or care. How to care for yourself at this stage of your pregnancy:       Eat a variety of healthy foods. Healthy foods include fruits, vegetables, whole-grain breads, low-fat dairy foods, beans, lean meats, and fish. Drink liquids as directed. Ask how much liquid to drink each day and which liquids are best for you. Limit caffeine to less than 200 milligrams each day. Limit your intake of fish to 2 servings each week. Choose fish low in mercury such as canned light tuna, shrimp, salmon, cod, or tilapia. Do not  eat fish high in mercury such as swordfish, tilefish, rigoberto mackerel, and shark.          Take prenatal vitamins as directed. Your need for certain vitamins and minerals, such as folic acid, increases during pregnancy. Prenatal vitamins provide some of the extra vitamins and minerals you need. Prenatal vitamins may also help to decrease the risk of certain birth defects. Talk to your healthcare provider about exercise. Moderate exercise can help you stay fit. Your healthcare provider will help you plan an exercise program that is safe for you during pregnancy. Do not smoke. Smoking increases your risk of a miscarriage and other health problems during your pregnancy. Smoking can cause your baby to be born too early or weigh less at birth. Ask your healthcare provider for information if you need help quitting. Do not drink alcohol. Alcohol passes from your body to your baby through the placenta. It can affect your baby's brain development and cause fetal alcohol syndrome (FAS). FAS is a group of conditions that causes mental, behavior, and growth problems. Talk to your healthcare provider before you take any medicines. Many medicines may harm your baby if you take them when you are pregnant. Do not take any medicines, vitamins, herbs, or supplements without first talking to your healthcare provider. Never use illegal or street drugs (such as marijuana or cocaine) while you are pregnant. Safety tips during pregnancy:   Avoid hot tubs and saunas. Do not use a hot tub or sauna while you are pregnant, especially during your first trimester. Hot tubs and saunas may raise your baby's temperature and increase the risk of birth defects. Avoid toxoplasmosis. This is an infection caused by eating raw meat or being around infected cat feces. It can cause birth defects, miscarriages, and other problems. Wash your hands after you touch raw meat. Make sure any meat is well-cooked before you eat it. Avoid raw eggs and unpasteurized milk.  Use gloves or ask someone else to clean your cat's litter box while you are pregnant. Changes happening with your baby:  By 22 weeks, your baby is about 8 inches long from the top of the head to the rump (baby's bottom). Your baby also weighs about 1 pound. Your baby is becoming much more active. You may be able to feel the baby move inside you now. The first movements may not be that noticeable. They may feel like a fluttering sensation. As time goes on, your baby's movements will become stronger and more noticeable. What you need to know about prenatal care:  During the first 28 weeks of your pregnancy, you will see your healthcare provider once a month. Your healthcare provider will check your blood pressure and weight. You may also need the following:  A urine test  may also be done to check for sugar and protein. These can be signs of gestational diabetes or infection. Protein in your urine may also be a sign of preeclampsia. Preeclampsia is a condition that can develop during week 20 or later of your pregnancy. It causes high blood pressure, and it can cause problems with your kidneys and other organs. Fundal height  is a measurement of your uterus to check your baby's growth. This number is usually the same as the number of weeks that you have been pregnant. A fetal ultrasound  shows pictures of your baby inside your uterus. It shows your baby's development. The movement and position of your baby can also be seen. Your healthcare provider may be able to tell you what your baby's gender is during the ultrasound. Your baby's heart rate  will be checked. Follow up with your obstetrician as directed:  Write down your questions so you remember to ask them during your visits. © Copyright Lottietta Seamus 2022 Information is for End User's use only and may not be sold, redistributed or otherwise used for commercial purposes. The above information is an  only.  It is not intended as medical advice for individual conditions or treatments. Talk to your doctor, nurse or pharmacist before following any medical regimen to see if it is safe and effective for you.  Labor   AMBULATORY CARE:    (premature) labor  occurs when the uterus contracts and your cervix opens earlier than normal. The cervix is the opening of your uterus.  labor happens after the 20th week of pregnancy but before the 37th week. You may have premature rupture of membranes (PROM). PROM means your water broke before labor began. An early labor could cause you to have your baby before he or she is ready to be born. Common signs and symptoms include the following: You may not know that you are having  labor. It is common to have  contractions (tightening and relaxing of the uterus) and not notice them. The following are signs and symptoms that suggest a  labor:  Contractions that get stronger and closer together    Changes in vaginal discharge, such as more discharge or discharge that is watery or bloody     Low back pain     Pressure in the lower abdomen     Vaginal spotting or bleeding    Call your local emergency number (911 in the [address]) if:   You see or feel like there is something in your vagina. Call your doctor if:   You have bright red, painless vaginal bleeding. Your symptoms do not get better or they get worse. Your water broke or you feel warm water gushing or trickling from your vagina. You have contractions that get stronger and closer together for more than 1 hour. You notice a decrease in your baby's movement. You have abdominal cramps, pressure, or tightening. You have a change in vaginal discharge. You have a fever. You have burning when you urinate or you are urinating less than is normal for you. You have questions or concerns about your condition or care. How  labor is diagnosed:   You may have one or more of the following tests to check for  labor:  A pelvic exam  is also called an internal or vaginal exam. During a pelvic exam, your healthcare provider will gently put a warmed speculum into your vagina. A speculum is a tool that opens your vagina. This lets your healthcare provider see if your cervix is opening. A vaginal ultrasound  uses sound waves to show pictures of your cervix and your baby inside your uterus. During this test, a small tube is placed into your vagina. This test will help your healthcare provider see if your cervix is opening. A fetal ultrasound  uses sound waves to show pictures of your baby inside your uterus. The movement, heart rate, and position of your baby can also be seen. A fetal fibronectin test  checks for a protein called fetal fibronectin in the cervix or vagina. Normally, there is no protein in cervical and vaginal secretions until the 20th week of pregnancy up to the end of pregnancy. Blood and urine tests  may be done to look for signs of infection. Treatment for  labor  may delay delivery. You may need any of the following:  Bed rest  may be recommended. You may need to lie on your left side, which improves circulation to the uterus and baby. Your healthcare provider will tell you when it is okay to get out of bed. Medicine  may be given to stop contractions if your baby is not ready to be born. You may also need certain medicines if your  labor cannot be stopped and your healthcare provider thinks you will have your baby early. These medicines help your baby's lungs, brain, and digestive organs mature. They also help decrease your baby's risk of being born with cerebral palsy. If you have PROM, fluid from your vagina or rectum will be checked for a strep infection. You may be given antibiotics to prevent a strep infection during delivery. Antibiotics may also be used to prevent labor from starting.  You may also need steroids to decrease the risk for complications due to  labor.    Self-care:   Rest  as much as possible. You may need to lie on your left side to improve circulation to the uterus and baby. You may be able to prevent  labor by resting and reducing your physical activity. Ask your healthcare provider about activities that are safe for you to do. Your healthcare provider or obstetrician may recommend that you avoid sexual intercourse. Ask your healthcare provider if exercise is safe. Drink liquids as directed. Ask how much liquid to drink each day and which liquids are best for you. Do not smoke. Your baby may not grow well and he or she may weigh less at birth if you smoke during pregnancy. Smoking also increases the risk that your baby will be born too early. Nicotine and other chemicals in cigarettes and cigars can cause lung damage. Ask your healthcare provider for information if you currently smoke and need help to quit. E-cigarettes or smokeless tobacco still contain nicotine. Talk to your healthcare provider before you use these products. Do not drink alcohol. Alcohol may harm your unborn baby and cause  labor. Maintain a healthy weight. A healthy weight may prevent  labor. Ask your healthcare provider how much weight you should gain during your pregnancy. Follow up with your doctor as directed:  Write down your questions so you remember to ask them during your visits. © Copyright Judy Cooper  Information is for End User's use only and may not be sold, redistributed or otherwise used for commercial purposes. The above information is an  only. It is not intended as medical advice for individual conditions or treatments. Talk to your doctor, nurse or pharmacist before following any medical regimen to see if it is safe and effective for you.

## 2023-07-21 ENCOUNTER — TELEPHONE (OUTPATIENT)
Dept: PERINATAL CARE | Facility: OTHER | Age: 28
End: 2023-07-21

## 2023-07-21 NOTE — TELEPHONE ENCOUNTER
Unable to reach patient by phone, left voicemail explaining our [facility] office has had a slight change in scheduling for 07/24/2023. We rescheduled her appointment to July 24 @ 9 a.mJonny Santana If this is not convenient, please contact our office at [phone]. We do apologize for any inconvenience.

## 2023-07-23 NOTE — PROGRESS NOTES
Please refer to the [facility] ultrasound report in Ob Procedures for additional information regarding today's visit

## 2023-07-24 ENCOUNTER — ULTRASOUND (OUTPATIENT)
Dept: PERINATAL CARE | Facility: OTHER | Age: 28
End: 2023-07-24
Payer: COMMERCIAL

## 2023-07-24 VITALS
WEIGHT: 147.6 LBS | BODY MASS INDEX: 25.2 KG/M2 | SYSTOLIC BLOOD PRESSURE: 100 MMHG | HEIGHT: 64 IN | HEART RATE: 99 BPM | DIASTOLIC BLOOD PRESSURE: 60 MMHG

## 2023-07-24 DIAGNOSIS — Z36.89 ENCOUNTER FOR FETAL ANATOMIC SURVEY: ICD-10-CM

## 2023-07-24 DIAGNOSIS — Z3A.23 23 WEEKS GESTATION OF PREGNANCY: Primary | ICD-10-CM

## 2023-07-24 PROCEDURE — 76816 OB US FOLLOW-UP PER FETUS: CPT | Performed by: OBSTETRICS & GYNECOLOGY

## 2023-07-24 NOTE — LETTER
July 24, 2023     Jack Troy MD  [address] [place] [id number]    Patient: Manuel Mcdonald   YOB: 1995   Date of Visit: 7/24/2023       Dear Dr. Delphine Do:    Thank you for referring Manuel Mcdonald to me for evaluation. Below are my notes for this consultation. If you have questions, please do not hesitate to call me. I look forward to following your patient along with you.          Sincerely,        Paris Romo MD        CC: No Recipients    Paris Romo MD  7/23/2023  2:07 PM  Sign when Signing Visit  Please refer to the [facility] ultrasound report in Ob Procedures for additional information regarding today's visit

## 2023-07-25 ENCOUNTER — TELEPHONE (OUTPATIENT)
Dept: PERINATAL CARE | Facility: OTHER | Age: 28
End: 2023-07-25

## 2023-07-26 NOTE — PATIENT INSTRUCTIONS
Pregnancy at 23 to 26 Weeks   AMBULATORY CARE:   What changes are happening to your body: You are now close to or at the beginning of the third trimester. The third trimester starts at 24 weeks and ends with delivery. As your baby gets larger, you may develop certain symptoms. These may include pain in your back or down the sides of your abdomen. You may also have stretch marks on your abdomen, breasts, thighs, or buttocks. You may also have constipation. Seek care immediately if:   You develop a severe headache that does not go away. You have new or increased vision changes, such as blurred or spotted vision. You have new or increased swelling in your face or hands. You have vaginal spotting or bleeding. Your water broke or you feel warm water gushing or trickling from your vagina. Call your doctor or obstetrician if:   You have abdominal cramps, pressure, or tightening. You have a change in vaginal discharge. You have light bleeding. You have chills or a fever. You have vaginal itching, burning, or pain. You have yellow, green, white, or foul-smelling vaginal discharge. You have pain or burning when you urinate, less urine than usual, or pink or bloody urine. You have questions or concerns about your condition or care. How to care for yourself at this stage of your pregnancy:       Eat a variety of healthy foods. Healthy foods include fruits, vegetables, whole-grain breads, low-fat dairy foods, beans, lean meats, and fish. Drink liquids as directed. Ask how much liquid to drink each day and which liquids are best for you. Limit caffeine to less than 200 milligrams each day. Limit your intake of fish to 2 servings each week. Choose fish low in mercury such as canned light tuna, shrimp, salmon, cod, or tilapia. Do not  eat fish high in mercury such as swordfish, tilefish, rigoberto mackerel, and shark. Manage back pain.   Do not stand for long periods of time or lift heavy items. Use good posture while you stand, squat, or bend. Wear low-heeled shoes with good support. Rest may also help to relieve back pain. Ask your healthcare provider about exercises you can do to strengthen your back muscles. Take prenatal vitamins as directed. Your need for certain vitamins and minerals, such as folic acid, increases during pregnancy. Prenatal vitamins provide some of the extra vitamins and minerals you need. Prenatal vitamins may also help to decrease the risk of certain birth defects. Talk to your healthcare provider about exercise. Moderate exercise can help you stay fit. Your healthcare provider will help you plan an exercise program that is safe for you during pregnancy. Do not smoke. Smoking increases your risk of a miscarriage and other health problems during your pregnancy. Smoking can cause your baby to be born too early or weigh less at birth. Ask your healthcare provider for information if you need help quitting. Do not drink alcohol. Alcohol passes from your body to your baby through the placenta. It can affect your baby's brain development and cause fetal alcohol syndrome (FAS). FAS is a group of conditions that causes mental, behavior, and growth problems. Talk to your healthcare provider before you take any medicines. Many medicines may harm your baby if you take them when you are pregnant. Do not take any medicines, vitamins, herbs, or supplements without first talking to your healthcare provider. Never use illegal or street drugs (such as marijuana or cocaine) while you are pregnant. Safety tips during pregnancy:   Avoid hot tubs and saunas. Do not use a hot tub or sauna while you are pregnant, especially during your first trimester. Hot tubs and saunas may raise your baby's temperature and increase the risk of birth defects. Avoid toxoplasmosis. This is an infection caused by eating raw meat or being around infected cat feces.  It can cause birth defects, miscarriages, and other problems. Wash your hands after you touch raw meat. Make sure any meat is well-cooked before you eat it. Avoid raw eggs and unpasteurized milk. Use gloves or ask someone else to clean your cat's litter box while you are pregnant. Changes that are happening with your baby:  By 26 weeks, your baby will weigh about 2 pounds. Your baby will be about 10 inches long from the top of the head to the rump (baby's bottom). Your baby's movements are much stronger now. Your baby's eyes are almost completely formed and can partially open. Your baby also sleeps and wakes up. What you need to know about prenatal care: Your healthcare provider will check your blood pressure and weight. You may also need the following:  A urine test  may also be done to check for sugar and protein. These can be signs of gestational diabetes or infection. Protein in your urine may also be a sign of preeclampsia. Preeclampsia is a condition that can develop during week 20 or later of your pregnancy. It causes high blood pressure, and it can cause problems with your kidneys and other organs. A gestational diabetes screen  may be done. Your healthcare provider may order either a 1-step or 2-step oral glucose tolerance test (OGTT). 1-step OGTT:  Your blood sugar level will be tested after you have not eaten for 8 hours (fasting). You will then be given a glucose drink. Your level will be tested again 1 hour and 2 hours after you finish the drink. 2-step OGTT:  You do not have to fast for the first part of the test. You will have the glucose drink at any time of day. Your blood sugar level will be checked 1 hour later. If your blood sugar is higher than a certain level, another test will be ordered. You will fast and your blood sugar level will be tested. You will have the glucose drink. Your blood will be tested again 1 hour, 2 hours, and 3 hours after you finish the glucose drink.     Fundal height is a measurement of your uterus to check your baby's growth. This number is usually the same as the number of weeks that you have been pregnant. Your baby's heart rate  will be checked. Follow up with your doctor or obstetrician as directed:  Write down your questions so you remember to ask them during your visits. © Copyright Onelia Brown 2022 Information is for End User's use only and may not be sold, redistributed or otherwise used for commercial purposes. The above information is an  only. It is not intended as medical advice for individual conditions or treatments. Talk to your doctor, nurse or pharmacist before following any medical regimen to see if it is safe and effective for you. [place] Contractions   AMBULATORY CARE:   [place] contractions  are tightening and squeezing of the muscles of your uterus (womb) during pregnancy. The uterine muscles control the uterus. [place] contractions stop on their own. They are not true labor contractions and do not cause your cervix (opening to your uterus) to dilate (open). Common symptoms include the following:   Pain or discomfort in your groin or lower abdomen that comes and goes    Your contractions are short, and do not last longer each time they happen    Your contractions do not get closer together each time    Your contractions do not get stronger or more painful each time    Your contractions stop when you change your position or rest    Seek care immediately if:   You have bleeding from your vagina. You have fluid leaking from your vagina that does not stop. You feel a gush of fluid from your vagina. Your contractions happen every 5 minutes or sooner, and last for more than 60 seconds. Your contractions begin to feel stronger or more painful. You feel a change in your baby's movement, or you feel fewer than 6 to 10 movements in an hour. Call your doctor or obstetrician if:   You have a fever.     You have questions or concerns about your condition or care. Treatment for [place] contractions  may include pain medicine to relieve discomfort or pain or sedatives to relax the muscles of your uterus. If you are dehydrated, he or she may give you fluids through an IV or tell you to drink liquids. Self-care:   Change your activity or your position  when you feel contractions begin. Walk if you have been lying or sitting. Lie down if you have been standing or walking. True labor will not stop by changing your position or activity. Take a warm bath  to relax your body. Drink more liquids  to prevent dehydration. Ask how much liquid to drink each day and which liquids are best for you. Practice your labor breathing  to decrease your discomfort. This may help you get ready for true labor. Take slow, deep breaths, or fast, short breaths. Ask your healthcare provider how to practice labor breathing. Follow up with your doctor or obstetrician as directed:  Write down your questions so you remember to ask them during your visits. © Copyright Jah Ross 2022 Information is for End User's use only and may not be sold, redistributed or otherwise used for commercial purposes. The above information is an  only. It is not intended as medical advice for individual conditions or treatments. Talk to your doctor, nurse or pharmacist before following any medical regimen to see if it is safe and effective for you. Round Ligament Pain   WHAT YOU NEED TO KNOW:   What is round ligament pain? Round ligament pain is caused when ligaments are stretched as your uterus (womb) gets bigger during pregnancy. Round ligaments are found on each side of your uterus. They are bands of tissue that hold the uterus in place. Round ligament pain happens most often during the second trimester. It is a normal part of pregnancy and should stop by the third trimester. The pain is not serious and will not hurt your baby. What are the signs and symptoms of round ligament pain? Pain on one or both sides of your lower abdomen or groin that may move up to your hip    Spasms in the muscles in your abdomen    Pain that lasts a few seconds    Pain that happens when you exercise, sneeze, change positions, or stand quickly    How is round ligament pain diagnosed? Your healthcare provider will examine you and ask about your pain. Tell the provider when the pain started, and if you feel it on one or both sides. Your provider may ask if anything helps the pain or makes it worse. What can I do to manage my pain? Round ligament pain does not need to be treated. The following may help make you more comfortable:  Rest as often as you can. Rest can help relieve round ligament pain. You might want to lie on the side that has pain. Place a pillow under your abdomen. Keep another pillow between your knees. Move slowly. Sudden movement can stretch the ligaments and cause pain. Stand, sit, and change positions slowly. Try to tighten the muscles in your hips before you sneeze or laugh. You can also sit down and bring your knees up toward your abdomen. This can help relieve tension on the ligaments. Exercise as directed. Gentle exercise can keep the ligaments loose and strengthen core (abdominal) muscles. An example is swimming, or a yoga program designed for pregnancy. Ask your healthcare provider which exercises are safe for you and how often to exercise. For most healthy women, a good goal is to try to get at least 30 minutes of exercise every day. If activity causes pain, try not to walk too long or too far at one time. Break your exercise up into short amounts. Apply a warm compress to the area. Warmth can relieve pain and muscle spasms. Ask your healthcare provider if you can take a warm bath or use a heating pad. Keep all heat settings low. High heat can be dangerous for your baby.  Do not sit in a hot tub or use hot water in your bath. You may also be able to massage the area gently while you are applying heat. Massage can help relieve pain. Ask about pain medicines. Ask your healthcare provider before you take any medicine during pregnancy, including over-the-counter pain medicines. Your healthcare provider may recommend acetaminophen to relieve the pain. Ask how much to take and how often to take it. Follow directions. Acetaminophen can cause liver damage. Too much medicine can be harmful to your baby. When should I contact my healthcare provider? You have pain that is spreading to other parts of your body. You have new or worsening pain. You have pain that lasts longer than a few minutes at a time. You have questions or concerns about your condition or care. CARE AGREEMENT:   You have the right to help plan your care. Learn about your health condition and how it may be treated. Discuss treatment options with your healthcare providers to decide what care you want to receive. You always have the right to refuse treatment. The above information is an  only. It is not intended as medical advice for individual conditions or treatments. Talk to your doctor, nurse or pharmacist before following any medical regimen to see if it is safe and effective for you. © Copyright Trevor Noovi 2022 Information is for End User's use only and may not be sold, redistributed or otherwise used for commercial purposes. Kick Counts in Pregnancy   AMBULATORY CARE:   Kick counts  measure how much your baby is moving in your womb. A kick from your baby can be felt as a twist, turn, swish, roll, or jab. It is common to feel your baby kicking at 26 to 28 weeks of pregnancy. You may feel your baby kick as early as 20 weeks of pregnancy. You may want to start counting at 28 weeks. Contact your doctor immediately if:   You feel a change in the number of kicks or movements of your baby.      You feel fewer than 10 kicks within 2 hours.     You have questions or concerns about your baby's movements. Why measure kick counts:  Your baby's movement may provide information about your baby's health. He or she may move less, or not at all, if there are problems. Your baby may move less if he or she is not getting enough oxygen or nutrition from the placenta. Do not smoke while you are pregnant. Smoking decreases the amount of oxygen that gets to your baby. Talk to your healthcare provider if you need help to quit smoking. Tell your healthcare provider as soon as you feel a change in your baby's movements. When to measure kick counts:   Measure kick counts at the same time every day. Measure kick counts when your baby is awake and most active. Your baby may be most active in the evening. How to measure kick counts:  Check that your baby is awake before you measure kick counts. You can wake up your baby by lightly pushing on your belly, walking, or drinking something cold. Your healthcare provider may tell you different ways to measure kick counts. You may be told to do the following:  Use a chart or clock to keep track of the time you start and finish counting. Sit in a chair or lie on your left side. Place your hands on the largest part of your belly. Count until you reach 10 kicks. Write down how much time it takes to count 10 kicks. It may take 30 minutes to 2 hours to count 10 kicks. It should not take more than 2 hours to count 10 kicks. Follow up with your doctor as directed:  Write down your questions so you remember to ask them during your visits. © Copyright [employer] 2022 Information is for End User's use only and may not be sold, redistributed or otherwise used for commercial purposes. The above information is an  only. It is not intended as medical advice for individual conditions or treatments.  Talk to your doctor, nurse or pharmacist before following any medical regimen to see if it is safe and effective for you.

## 2023-07-26 NOTE — ASSESSMENT & PLAN NOTE
Dhaval Gant is a 32 y.o.  24w3d  TWG 11.6 kg (25 lb 9.6 oz)   28 week labs ordered   Feels well. Denies LOF/CTX/VB. No concerns. AFP completed Neg  Vaccines discussed    labor precautions reviewed. Encouraged adequate hydration and nutrition  Pregnancy Essential guide and Baby and Me center web site recommended.

## 2023-07-28 ENCOUNTER — ROUTINE PRENATAL (OUTPATIENT)
Dept: OBGYN CLINIC | Facility: CLINIC | Age: 28
End: 2023-07-28
Payer: COMMERCIAL

## 2023-07-28 VITALS
SYSTOLIC BLOOD PRESSURE: 104 MMHG | BODY MASS INDEX: 25.37 KG/M2 | DIASTOLIC BLOOD PRESSURE: 64 MMHG | HEIGHT: 64 IN | WEIGHT: 148.6 LBS

## 2023-07-28 DIAGNOSIS — Z11.3 SCREENING FOR STDS (SEXUALLY TRANSMITTED DISEASES): ICD-10-CM

## 2023-07-28 DIAGNOSIS — Z3A.24 24 WEEKS GESTATION OF PREGNANCY: Primary | ICD-10-CM

## 2023-07-28 LAB
SL AMB  POCT GLUCOSE, UA: NORMAL
SL AMB POCT URINE PROTEIN: NORMAL

## 2023-07-28 PROCEDURE — 81002 URINALYSIS NONAUTO W/O SCOPE: CPT | Performed by: OBSTETRICS & GYNECOLOGY

## 2023-07-28 PROCEDURE — 99213 OFFICE O/P EST LOW 20 MIN: CPT | Performed by: OBSTETRICS & GYNECOLOGY

## 2023-07-28 NOTE — PROGRESS NOTES
Problem   24 Weeks Gestation of Pregnancy    Blood Type: O positive. Antibody negative  Pap 2021 neg  GC/CT -  neg  PN1 Labs- wnl   28 Week Labs-  COVID vaccine- x2  Flu vaccine - completed  Blue folder- reviewed    Genetic screening- NIPS low  AFP- Neg  Level 1-   Level 2-     Yellow folder-  TDAP -   Delivery consent-  Breast pump -   Pediatrician -    Perineal massage -  GBS swab -   IOL -       24 weeks gestation of pregnancy  Radha Almanzar is a 32 y.o.  24w3d  TWG 11.6 kg (25 lb 9.6 oz)   28 week labs ordered   Feels well. Denies LOF/CTX/VB. No concerns. AFP completed Neg  Vaccines discussed    labor precautions reviewed. Encouraged adequate hydration and nutrition  Pregnancy Essential guide and Baby and Me center web site recommended.

## 2023-07-28 NOTE — PROGRESS NOTES
Prenatal Visit 24w3d    Patient denies any lof, vb or ctx. Patient has +fm. Having a Girl  Patient urine is neg/neg  28 week labs ordered  Patient c/o pressure on her pelvic bone.

## 2023-08-15 ENCOUNTER — TELEPHONE (OUTPATIENT)
Dept: OBGYN CLINIC | Facility: CLINIC | Age: 28
End: 2023-08-15

## 2023-08-15 NOTE — TELEPHONE ENCOUNTER
Went on vacation for a week in [facility] her water broke 8/14  2 am     [facility]. Admitted antepartum trying to keep baby in for as long as possible. Will cancel upcoming appts- patient advised to call after she delivers.

## 2023-08-22 ENCOUNTER — TELEMEDICINE (OUTPATIENT)
Dept: PSYCHIATRY | Facility: CLINIC | Age: 28
End: 2023-08-22
Payer: COMMERCIAL

## 2023-08-22 DIAGNOSIS — F31.9 BIPOLAR 1 DISORDER (HCC): Primary | ICD-10-CM

## 2023-08-22 PROCEDURE — 99213 OFFICE O/P EST LOW 20 MIN: CPT | Performed by: NURSE PRACTITIONER

## 2023-08-22 RX ORDER — QUETIAPINE FUMARATE 100 MG/1
100 TABLET, FILM COATED ORAL
Qty: 30 TABLET | Refills: 0 | Status: SHIPPED | OUTPATIENT
Start: 2023-08-22

## 2023-08-23 NOTE — PSYCH
Virtual Brief Visit     This Visit is being completed by telephone. The Patient is located at Other and in the following state in which I hold an active license Other; [address] [place] [address]     The patient was identified by name and date of birth. Rosalina Osgood was informed that this is a telemedicine visit and that the visit is being conducted through the [employer]. She agrees to proceed. .  My office door was closed. No one else was in the room. She acknowledged consent and understanding of privacy and security of the video platform. The patient has agreed to participate and understands they can discontinue the visit at any time.     Patient is aware this is a billable service.         Assessment/Plan:     Spoke to [employer] briefly, as she let me know that she is currently in [address] [place] [address]. She was on vacation and gave birth to her baby prematurely at 32 weeks. She states that she will likely be in [facility] until January as her baby is anticipated to be in the NICU there for many months. She is visibly in pain when we spoke, she states she just had a . This provider did explain to her that I am not licensed in [facility] and am unable to have visits with her at this time due to her location status. She is currently inpatient on labor and delivery in the hospital in [facility]. She asked that she please be given something to help with the anger, as she continues to have a lot of anger concerns. I will continue to treat by phone, as she will not be able to establish care for the duration of her time in [facility]. She states that I can continue to prescribe her medications to her [place] pharmacy and her mother will mail them to her in [facility]. At this time, we will increase her seroquel to 100mg PO HS. We will speak again in 2 weeks, and at that time will reassess, with the anticipation that we will increase the seroquel to 150mg XL. She denies SI/HI.   Denies any Auditory or visual hallucinations at this time.            Problem List Items Addressed This Visit               Other     Bipolar 1 disorder ([address]) - Primary     Relevant Medications     QUEtiapine (SEROquel) 100 mg tablet         Recent Visits  No visits were found meeting these conditions. Showing recent visits within past 7 days and meeting all other requirements  Future Appointments  No visits were found meeting these conditions.   Showing future appointments within next 150 days and meeting all other requirements           Visit Time  Total Visit Duration: 10 minutes

## 2023-08-30 ENCOUNTER — TELEPHONE (OUTPATIENT)
Dept: OBGYN CLINIC | Facility: CLINIC | Age: 28
End: 2023-08-30

## 2023-10-05 ENCOUNTER — TELEPHONE (OUTPATIENT)
Dept: PSYCHIATRY | Facility: CLINIC | Age: 28
End: 2023-10-05

## 2023-10-05 DIAGNOSIS — F31.9 BIPOLAR 1 DISORDER (HCC): Primary | ICD-10-CM

## 2023-10-05 DIAGNOSIS — F31.9 BIPOLAR 1 DISORDER (HCC): ICD-10-CM

## 2023-10-05 RX ORDER — QUETIAPINE 150 MG/1
150 TABLET, FILM COATED, EXTENDED RELEASE ORAL
Qty: 30 TABLET | Refills: 0 | Status: SHIPPED | OUTPATIENT
Start: 2023-10-05

## 2023-10-05 RX ORDER — QUETIAPINE FUMARATE 100 MG/1
100 TABLET, FILM COATED ORAL
Qty: 30 TABLET | Refills: 0 | OUTPATIENT
Start: 2023-10-05

## 2023-10-05 NOTE — TELEPHONE ENCOUNTER
Spoke to Fili Kong, she is back in [place] and has an appt scheduled on 10/16. She states her daughter passed away in [facility]. She is "taking it day by day". No SI. Will change her seroquel to XR 150mg PO HS.

## 2023-10-16 ENCOUNTER — TELEMEDICINE (OUTPATIENT)
Dept: PSYCHIATRY | Facility: CLINIC | Age: 28
End: 2023-10-16
Payer: COMMERCIAL

## 2023-10-16 DIAGNOSIS — F31.9 BIPOLAR 1 DISORDER (HCC): Primary | ICD-10-CM

## 2023-10-16 PROCEDURE — 99214 OFFICE O/P EST MOD 30 MIN: CPT | Performed by: NURSE PRACTITIONER

## 2023-10-16 RX ORDER — QUETIAPINE 300 MG/1
300 TABLET, FILM COATED, EXTENDED RELEASE ORAL
Qty: 30 TABLET | Refills: 1 | Status: SHIPPED | OUTPATIENT
Start: 2023-10-16

## 2023-10-18 NOTE — PSYCH
Virtual Regular Visit    Verification of patient location:    Patient is located in the following state in which I hold an active license [place]    Problem List Items Addressed This Visit       Bipolar 1 disorder ([address]) - Primary    Relevant Medications    QUEtiapine (SEROquel XR) 300 mg 24 hr tablet          Encounter provider PAOLA Wilson    Provider located at    [address] [facility]  [address] [facility] [address] [phone]    Recent Visits  Date Type Provider Dept   10/16/23 2[address]   Showing recent visits within past 7 days and meeting all other requirements  Future Appointments  No visits were found meeting these conditions. Showing future appointments within next 150 days and meeting all other requirements           The patient was identified by name and date of birth. Patient was informed that this is a telemedicine visit and that [facility]. She agrees to proceed. .  My office door was closed. No one else was in the room. She acknowledged consent and understanding of privacy and security of the video platform. The patient has agreed to participate and understands they can discontinue the visit at any time. Patient is aware this is a billable service. HPI     Current Outpatient Medications   Medication Sig Dispense Refill    QUEtiapine (SEROquel XR) 300 mg 24 hr tablet Take 1 tablet (300 mg total) by mouth daily at bedtime 30 tablet 1    Prenatal Vit-Fe Fumarate-FA (PRENATAL VITAMIN PO) Take by mouth       No current facility-administered medications for this visit. Review of Systems  Video Exam    There were no vitals filed for this visit. Physical Exam   As a result of this visit, I have referred the patient for further respiratory evaluation.  No    I spent 25 minutes directly with the patient during this visit  VIRTUAL VISIT DISCLAIMER    Elham Gómez acknowledges that she has consented to an online visit or consultation. She understands that the online visit is based solely on information provided by her, and that, in the absence of a face-to-face physical evaluation by the physician, the diagnosis she receives is both limited and provisional in terms of accuracy and completeness. This is not intended to replace a full medical face-to-face evaluation by the physician. Elham Gómez understands and accepts these terms. MEDICATION MANAGEMENT NOTE        [facility] [facility]    Name and Date of Birth:  Elham Gómez 29 y.o. 1995 MRN: [id number]    Date of Visit: October 16, 2023    Allergies   Allergen Reactions    Dust Mite Extract Hives       Visit Time    Visit Start Time: 6859  Visit Stop Time: 9225  Total Visit Duration: 25 minutes    SUBJECTIVE:    Janay Pereira is seen today for a follow up for Bipolar Disorder. She continues to decompensate slowly since the last visit. Bhanu Sweeney was seen virtually today for medication management follow-up. She was last seen by this provider on 8/22/2023 for a brief visit. At that time we had increased her Seroquel to 150 mg p.o. at bedtime. She states today she is having a hard time. She is angry and sad all of the time and she does not feel happy anymore. She states that she is always nasty to people and she lashes out at them. She is very depressed, denies suicidal ideation but sometimes wishes she were not alive. She continues to have trouble falling asleep but the Seroquel is helping her stay asleep. She does not believe it is helping with her mood or her agitation. She lost her job when she was forced to stay in [place] with her baby. She has not yet found another job. She is living at home with her mom right now.   She states that she gets so angry she does not know what is going on and she cannot control her actions or her mood. She denies homicidal ideation. She denies auditory or visual hallucinations. At this time, we will increase her Seroquel XR to 300 mg p.o. at bedtime. [facility]. She is motivated for treatment and wants to change, but is unsure of how to do this. We will follow up in 2 weeks to reassess. Seroquel PARQ completed including dizziness, sedation, GI distress, orthostatic hypotension and cardiovascular risks, metabolic syndrome, NMS, TD, EPS, Seizures, and others. Education provided on Neuroleptic malignant syndrome. Patient told that NMS is a life-threatening, neurological disorder most often caused by an adverse reaction to neuroleptic or antipsychotic drugs. Symptoms include high fever, sweating, unstable blood pressure, stupor, muscular rigidity, and autonomic dysfunction. In most cases, the disorder develops within the first 2 weeks of treatment with the drug; however, the disorder may develop any time during the therapy period. Patient advised to call the office or go to the ED immediately if these symptoms develop. She denies any side effects from current psychiatric medications. PLAN:  Increase Seroquel XR to 300mg PO HS    Aware of 24 hour and weekend coverage for urgent situations accessed by calling [facility] main practice number  Referral for individual psychotherapy  Medication management every 2 weeks  Aware of need to follow up with family physician for medical issues    Diagnoses and all orders for this visit:    Bipolar 1 disorder ([address])  -     QUEtiapine (SEROquel XR) 300 mg 24 hr tablet;  Take 1 tablet (300 mg total) by mouth daily at bedtime        Current Rating Scores:     Current PHQ-9   PHQ-2/9 Depression Screening    Little interest or pleasure in doing things: 3 - nearly every day  Feeling down, depressed, or hopeless: 3 - nearly every day  Trouble falling or staying asleep, or sleeping too much: 2 - more than half the days  Feeling tired or having little energy: 1 - several days  Poor appetite or overeatin - not at all  Feeling bad about yourself - or that you are a failure or have let yourself or your family down: 2 - more than half the days  Trouble concentrating on things, such as reading the newspaper or watching television: 0 - not at all  Moving or speaking so slowly that other people could have noticed. Or the opposite - being so fidgety or restless that you have been moving around a lot more than usual: 0 - not at all  Thoughts that you would be better off dead, or of hurting yourself in some way: 0 - not at all  PHQ-9 Score: 11   PHQ-9 Interpretation: Moderate depression            Current Outpatient Medications on File Prior to Visit   Medication Sig Dispense Refill    Prenatal Vit-Fe Fumarate-FA (PRENATAL VITAMIN PO) Take by mouth       No current facility-administered medications on file prior to visit. Psychotherapy Provided:     Individual psychotherapy provided: Yes  Counseling was provided during the session today for 16 minutes. Supportive counseling provided. Medications, treatment progress and treatment plan reviewed with Darya Odonnell. Medication changes discussed with Darya Odonnell. Medication education provided to Darya Odonnell. Discussed with Darya Odonnell coping with difficulty with anger management. Coping strategies reviewed with Darya Odonnell. Importance of medication and treatment compliance reviewed with Darya Odonnell. Importance of follow up with family physician for medical issues reviewed with Darya Odonnell. Reassurance and supportive therapy provided. Crisis/safety plan discussed with Darya Odonnell. Patient will call prior to scheduled appointment if they have any issues or concerns. Patient understands they can access the office by calling the main number at any time if they are in crisis.   They also understand they can call their [facility]'s crisis number or go to their nearest ED if suicidal ideation increases or if they develop a plan or intent. HPI ROS Appetite Changes and Sleep:     She reports difficulty falling asleep, normal appetite, normal energy level.  Denies homicidal ideation, denies suicidal ideation    Review Of Systems:      HPI ROS:               Medication Side Effects:   denies   Depression (10 worst): increased 0/10   Anxiety (10 worst): increased 0/10   Safety concerns (SI, HI, etc): denies denies   Sleep: Trouble falling asleep, but she does stay asleep 6 hours/night   Energy: adequate adequate   Appetite: good good     General normal    Personality no change in personality   Constitutional negative   ENT negative   Cardiovascular negative   Respiratory negative   Gastrointestinal negative   Genitourinary negative   Musculoskeletal negative   Integumentary negative   Neurological negative   Endocrine negative   Other Symptoms none     Mental Status Evaluation:    Appearance Appropriately dressed and Good eye contact   Behavior restless and fidgety   Mood anxious, depressed, irritable, and angry  Depression Scale - increased of 10 (0 = No depression)  Anxiety Scale - increased of 10 (0 = No anxiety)   Speech Normal rate and volume   Affect mood-congruent and Flat   Thought Processes Goal directed and coherent   Thought Content Does not verbalize delusional material   Associations Tightly connected   Perceptual Disturbances Denies hallucinations and does not appear to be responding to internal stimuli   Risk Potential Suicidal/Homicidal Ideation - No evidence of suicidal or homicidal ideation and patient does not verbalize suicidal or homicidal ideation  Risk of Violence - No evidence of risk for violence found on assessment  Risk of Self Mutilation - No evidence of risk for self mutilation found on assessment   Orientation oriented to person, place, time/date and situation   Memory recent and remote memory grossly intact   Consciousness alert and awake   Attention/Concentration attention span and concentration are age appropriate   Insight fair   Judgement fair   Muscle Strength and Gait normal muscle strength and normal muscle tone, normal gait/station and normal balance   Motor Activity no abnormal movements   Language no difficulty naming common objects, no difficulty repeating a phrase, no difficulty writing a sentence   Fund of Knowledge adequate knowledge of current events  adequate fund of knowledge regarding past history  adequate fund of knowledge regarding vocabulary      Past Psychiatric History   Previous diagnoses include Anxiety and Depression   Prior outpatient psychiatric treatment: denies - received meds through PCP   Prior therapy: denies   Prior inpatient psychiatric treatment: denies   Prior suicide attempts: denies   Prior self harm: yes, pulling hair and scratching face. Cut wrist in 9th grade. Prior violence or aggression: yes, harrassment charges and on probation. Has anger issues.     Past Psychiatric History Update:     Inpatient Psychiatric Admission Since Last Encounter:   no  Changes to Outpatient Psychiatric Treatment Team:    no  Suicide Attempt Or Self Mutilation Since Last Encounter:   no  Incidence of Violent Behavior Since Last Encounter:   no    Traumatic History Update:     New Onset of Abuse Since Last Encounter:   no  Traumatic Events Since Last Encounter:   no    Past Medical History:    Past Medical History:   Diagnosis Date    Anxiety     Depression     bipolar-serroquil    Miscarriage     miscarriage 2022-no D & C, 1 termination 2017    Self-injurious behavior     Varicella     had vaccines        Past Surgical History:   Procedure Laterality Date    MYRINGOTOMY W/ TUBES      age 1    TEAR DUCT SURGERY  1998     Allergies   Allergen Reactions    Dust Mite Extract Hives     Substance Abuse History:    Social History     Substance and Sexual Activity   Alcohol Use Not Currently    Comment: none with pregnancy     Social History     Substance and Sexual Activity   Drug Use Never    Comment: denies self or fiance- denies family hx except dad was alcoholic     Social History:    Social History     Socioeconomic History    Marital status:      Spouse name: Not on file    Number of children: 1    Years of education: some college    Highest education level: Some college, no degree   Occupational History     Employer: [employer]   Tobacco Use    Smoking status: Never    Smokeless tobacco: Never   Vaping Use    Vaping Use: Never used   Substance and Sexual Activity    Alcohol use: Not Currently     Comment: none with pregnancy    Drug use: Never     Comment: denies self or fiance- denies family hx except dad was alcoholic    Sexual activity: Yes     Partners: Male     Birth control/protection: None   Other Topics Concern    Not on file   Social History Narrative    Not on file     Social Determinants of Health     Financial Resource Strain: Not on file   Food Insecurity: Not on file   Transportation Needs: Not on file   Physical Activity: Inactive (9/21/2021)    Exercise Vital Sign     Days of Exercise per Week: 0 days     Minutes of Exercise per Session: 0 min   Stress: Stress Concern Present (9/21/2021)    [address] [facility]     Feeling of Stress : Very much   Social Connections: Not on file   Intimate Partner Violence: Not on file   Housing Stability: Not on file     Family Psychiatric History:     Family History   Problem Relation Age of Onset    Supraventricular tachycardia Mother     Ulcerative colitis Mother     Anxiety disorder Father     Atrial fibrillation Father     Alcohol abuse Father     No Known Problems Brother     Colon polyps Maternal Grandfather     Leukemia Paternal Grandmother     Acute myelogenous leukemia Paternal Grandmother      History Review: The following portions of the patient's history were reviewed and updated as appropriate: allergies, current medications, past family history, past medical history, past social history, past surgical history and problem list     OBJECTIVE:     Vital signs in last 24 hours: There were no vitals filed for this visit. Laboratory Results:   Recent Labs (last 2 months):   No visits with results within 2 Month(s) from this visit. Latest known visit with results is:   Routine Prenatal on 07/28/2023   Component Date Value    POCT URINE PROTEIN 07/28/2023 neg     GLUCOSE, UA 07/28/2023 neg      I have personally reviewed all pertinent laboratory/tests results. Suicide/Homicide Risk Assessment:    Risk of Harm to Self:  The following ratings are based on assessment at the time of the interview  Recent Specific Risk Factors include: current anxiety symptoms, chronic anger, loss of premature baby  Demographic risk factors include:   Historical Risk Factors include: history of mood disorder, history of impulsive behaviors, history of traumatic experiences, history of legal problems  Protective Factors: no current suicidal ideation, access to mental health treatment, being a parent, compliant with medications, compliant with mental health treatment, having a desire to be alive, responsibilities and duties to others, stable job, strong relationships, supportive family  Based on today's assessment, Lois Bowden presents the following risk of harm to self: low    Risk of Harm to Others:   The following ratings are based on assessment at the time of the interview  Protective Factors: no current homicidal ideation  Based on today's assessment, Lois Bowden presents the following risk of harm to others: none    The following interventions are recommended: contracts for safety at present - agrees to go to ED if feeling unsafe, referral for psychotherapy, return in 2 weeks for reassessment, contracts for safety at present - agrees to call Crisis Intervention Service if feeling unsafe    Medications Risks/Benefits:      Risks, Benefits And Possible Side Effects Of Medications:    Discussed risks and benefits of treatment with patient including risk of parkinsonian symptoms, metabolic syndrome, tardive dyskinesia and neuroleptic malignant syndrome related to treatment with antipsychotic medications     Controlled Medication Discussion:     Not applicable    Treatment Plan:    Due for update/Updated:   no  Last treatment plan done 10/16/23 by PAOLA Holliday. Treatment Plan due on 4/16/24. PAOLA Cardona 10/18/23    This note was shared with patient.

## 2023-10-18 NOTE — BH TREATMENT PLAN
TREATMENT PLAN (Medication Management Only)        [address] [facility]    Name and Date of Birth:  Amador Coffman 29 y.o. 1995  Date of Treatment Plan: October 18, 2023  Diagnosis/Diagnoses:    1. Bipolar 1 disorder [facility])      Strengths/Personal Resources for Self-Care: supportive family. Area/Areas of need (in own words): mood instability  1. Long Term Goal: increase mood stability. Target Date:6 months - 4/18/2024  Person/Persons responsible for completion of goal: Bladen  2. Short Term Objective (s) - How will we reach this goal?:   A. Provider new recommended medication/dosage changes and/or continue medication(s): Medication changes: I have increased her QUEtiapine. B. Get regular sleep every night . C. Call supportive people when needed . Target Date:6 months - 4/18/2024  Person/Persons Responsible for Completion of Goal: Bladen  Progress Towards Goals: continuing treatment, no progress  Treatment Modality: medication management every 2 weeks, referral for individual psychotherapy, medication education at every visit  Review due 180 days from date of this plan: 6 months - 4/18/2024  Expected length of service: ongoing treatment  My Physician/PA/NP and I have developed this plan together and I agree to work on the goals and objectives. I understand the treatment goals that were developed for my treatment.

## 2023-10-30 ENCOUNTER — TELEMEDICINE (OUTPATIENT)
Dept: PSYCHIATRY | Facility: CLINIC | Age: 28
End: 2023-10-30
Payer: COMMERCIAL

## 2023-10-30 DIAGNOSIS — F31.9 BIPOLAR 1 DISORDER (HCC): Primary | ICD-10-CM

## 2023-10-30 PROCEDURE — 99213 OFFICE O/P EST LOW 20 MIN: CPT | Performed by: NURSE PRACTITIONER

## 2023-10-30 RX ORDER — DIVALPROEX SODIUM 125 MG/1
TABLET, DELAYED RELEASE ORAL
Qty: 49 TABLET | Refills: 0 | Status: SHIPPED | OUTPATIENT
Start: 2023-10-30 | End: 2023-11-27

## 2023-10-30 NOTE — PSYCH
Virtual Brief Visit    This Visit is being completed by telephone. The Patient is located at Other and in the following state in which I hold an active license [place]    The patient was identified by name and date of birth. Estrella Villegas was informed that this is a telemedicine visit and that the visit is being conducted through Telephone. My office door was closed. No one else was in the room. She acknowledged consent and understanding of privacy and security of the video platform. The patient has agreed to participate and understands they can discontinue the visit at any time. Patient is aware this is a billable service. Assessment/Plan:    Elida Person was unable to connect via epic due to Internet issues. We did speak over the phone today. She reports today that the Seroquel is not working and she is upset all the time. She states she finds no magalys in anything and is never happy. She continues to have irritability and anger. She is eating okay. She does not fall asleep until 2 to 3 AM and only gets a few hours of sleep. She denies SI and HI, denies auditory or visual hallucinations. At this time we will initiate Depakote 125 mg p.o. at bedtime x 1 week, then she will increase to 250 mg p.o. at bedtime. She will have blood work completed to monitor her liver enzymes which were increased when she was in labor. She will also have a Depakote level done. These labs will be completed prior to 2 weeks. She understands the risks of pregnancy. She states she is not trying to become pregnant at this time and will alert this provider if she does become pregnant. We will follow up in one month. Depakote PARQ completed including GI distress, tremor, weight gain, and hepatic risks, blood dyscrasias/bone marrow effects, SIADH, pancreatitis, andrenergic effects, PCOS, allergic reactions, worsened depression/suicidality, fetal abnormality risks, and others including need for blood testing and monitoring. Problem List Items Addressed This Visit     Bipolar 1 disorder ([address]) - Primary    Relevant Medications    divalproex sodium (Depakote) 125 mg DR tablet    Other Relevant Orders    Comprehensive metabolic panel    Valproic acid level, total       Recent Visits  Date Type Provider Dept   10/30/23 Telemedicine PAOLA Wilson [facility]   Showing recent visits within past 7 days and meeting all other requirements  Future Appointments  No visits were found meeting these conditions.   Showing future appointments within next 150 days and meeting all other requirements         Visit Time  Total Visit Duration: 20 minutes

## 2023-11-17 ENCOUNTER — LAB (OUTPATIENT)
Dept: LAB | Facility: HOSPITAL | Age: 28
End: 2023-11-17
Payer: COMMERCIAL

## 2023-11-17 ENCOUNTER — TELEPHONE (OUTPATIENT)
Dept: PSYCHIATRY | Facility: CLINIC | Age: 28
End: 2023-11-17

## 2023-11-17 DIAGNOSIS — F31.9 BIPOLAR 1 DISORDER (HCC): ICD-10-CM

## 2023-11-17 LAB
ALBUMIN SERPL BCP-MCNC: 4.3 G/DL (ref 3.5–5)
ALP SERPL-CCNC: 134 U/L (ref 34–104)
ALT SERPL W P-5'-P-CCNC: 119 U/L (ref 7–52)
ANION GAP SERPL CALCULATED.3IONS-SCNC: 4 MMOL/L
AST SERPL W P-5'-P-CCNC: 100 U/L (ref 13–39)
BILIRUB SERPL-MCNC: 0.28 MG/DL (ref 0.2–1)
BUN SERPL-MCNC: 7 MG/DL (ref 5–25)
CALCIUM SERPL-MCNC: 9.7 MG/DL (ref 8.4–10.2)
CHLORIDE SERPL-SCNC: 106 MMOL/L (ref 96–108)
CO2 SERPL-SCNC: 29 MMOL/L (ref 21–32)
CREAT SERPL-MCNC: 0.83 MG/DL (ref 0.6–1.3)
GFR SERPL CREATININE-BSD FRML MDRD: 96 ML/MIN/1.73SQ M
GLUCOSE P FAST SERPL-MCNC: 88 MG/DL (ref 65–99)
POTASSIUM SERPL-SCNC: 4.6 MMOL/L (ref 3.5–5.3)
PROT SERPL-MCNC: 7.3 G/DL (ref 6.4–8.4)
SODIUM SERPL-SCNC: 139 MMOL/L (ref 135–147)
VALPROATE SERPL-MCNC: 35 UG/ML (ref 50–100)

## 2023-11-17 PROCEDURE — 80053 COMPREHEN METABOLIC PANEL: CPT

## 2023-11-17 PROCEDURE — 80164 ASSAY DIPROPYLACETIC ACD TOT: CPT

## 2023-11-17 PROCEDURE — 36415 COLL VENOUS BLD VENIPUNCTURE: CPT

## 2023-11-17 NOTE — TELEPHONE ENCOUNTER
Spoke to Darline Swansno regarding her high liver enzyme results. Let her know that she should call her PCP and make an appointment to discuss these results. Also told her to stop her depakote at this time. She verbalized understanding, although she voiced disappointment as she was starting to feel better with the depakote. Let her know I would to discuss more next week when I'm in the office.

## 2023-11-28 ENCOUNTER — TELEMEDICINE (OUTPATIENT)
Dept: PSYCHIATRY | Facility: CLINIC | Age: 28
End: 2023-11-28
Payer: COMMERCIAL

## 2023-11-28 DIAGNOSIS — F31.9 BIPOLAR 1 DISORDER (HCC): Primary | ICD-10-CM

## 2023-11-28 PROCEDURE — 99214 OFFICE O/P EST MOD 30 MIN: CPT | Performed by: NURSE PRACTITIONER

## 2023-11-28 RX ORDER — OLANZAPINE 2.5 MG/1
2.5 TABLET, FILM COATED ORAL
Qty: 30 TABLET | Refills: 1 | Status: SHIPPED | OUTPATIENT
Start: 2023-11-28 | End: 2023-11-29 | Stop reason: SINTOL

## 2023-11-29 ENCOUNTER — OFFICE VISIT (OUTPATIENT)
Age: 28
End: 2023-11-29
Payer: COMMERCIAL

## 2023-11-29 VITALS
BODY MASS INDEX: 23.39 KG/M2 | DIASTOLIC BLOOD PRESSURE: 72 MMHG | OXYGEN SATURATION: 98 % | TEMPERATURE: 98 F | HEIGHT: 64 IN | RESPIRATION RATE: 18 BRPM | SYSTOLIC BLOOD PRESSURE: 100 MMHG | HEART RATE: 60 BPM | WEIGHT: 137 LBS

## 2023-11-29 DIAGNOSIS — F31.9 BIPOLAR 1 DISORDER (HCC): ICD-10-CM

## 2023-11-29 DIAGNOSIS — R74.8 ELEVATED LIVER ENZYMES: Primary | ICD-10-CM

## 2023-11-29 PROBLEM — Z3A.24 24 WEEKS GESTATION OF PREGNANCY: Status: RESOLVED | Noted: 2023-04-26 | Resolved: 2023-11-29

## 2023-11-29 PROCEDURE — 99214 OFFICE O/P EST MOD 30 MIN: CPT

## 2023-11-29 NOTE — PROGRESS NOTES
Name: Natalia Gusman      : 1995      MRN: [id number]  Encounter Provider: PAOLA Randle  Encounter Date: 2023   Encounter department: [address]     1. Elevated liver enzymes  Presents this visit with complaints of elevated liver enzymes, states psychiatrist reviewed, stopped on Depakote due to increased risk of hepatotoxicity. Patient states that she needs clearance to take the Depakote. Discussed with patient that given the elevation in the liver enzymes, not cleared to take Depakote at this time, she will need to repeat the liver enzymes, if elevated still we will continue with further workup, ultrasound, possible GI referral.  -     Comprehensive metabolic panel; Future; Expected date: 2023    2. Bipolar 1 disorder ([address])  Patient is bipolar, states she was taking Depakote and Seroquel, however those have been stopped. Patient states she needs clearance to take a medication, however needs to hold at this time due to the elevated liver enzymes. Instructed to follow-up with psychiatrist for alternatives in the meantime. Subjective      Patient presents this visit, states she needs permission to go back on her Depakote, states was taking Seroquel and Depakote for bipolar and slipped however was stopped about 2 weeks ago due to elevated liver enzymes. Patient states took 1 dose of Zyprexa, however that was stopped as well, patient states she has some medication. Review of Systems   Constitutional:  Negative for chills and fever. HENT:  Negative for ear pain and sore throat. Eyes:  Negative for pain and visual disturbance. Respiratory:  Negative for cough and shortness of breath. Cardiovascular:  Negative for chest pain and palpitations. Gastrointestinal:  Negative for abdominal pain and vomiting. Genitourinary:  Negative for dysuria and hematuria. Musculoskeletal:  Negative for arthralgias and back pain. Skin:  Negative for color change and rash. Neurological:  Negative for seizures and syncope. Psychiatric/Behavioral:  Negative for sleep disturbance. The patient is nervous/anxious. All other systems reviewed and are negative. Current Outpatient Medications on File Prior to Visit   Medication Sig   • Prenatal Vit-Fe Fumarate-FA (PRENATAL VITAMIN PO) Take by mouth (Patient not taking: Reported on 11/29/2023)       Objective     /72 (BP Location: Left arm, Patient Position: Sitting, Cuff Size: Standard)   Pulse 60   Temp 98 °F (36.7 °C) (Tympanic)   Resp 18   Ht 5' 4" (1.626 m)   Wt 62.1 kg (137 lb)   LMP 01/30/2023 (Exact Date)   SpO2 98%   Breastfeeding Unknown   BMI 23.52 kg/m²     Physical Exam  Constitutional:       Appearance: Normal appearance. HENT:      Head: Normocephalic. Right Ear: Tympanic membrane normal.      Left Ear: Tympanic membrane normal.      Nose: Nose normal.   Eyes:      Conjunctiva/sclera: Conjunctivae normal.      Pupils: Pupils are equal, round, and reactive to light. Cardiovascular:      Rate and Rhythm: Normal rate and regular rhythm. Pulses: Normal pulses. Heart sounds: Normal heart sounds. Pulmonary:      Effort: Pulmonary effort is normal.      Breath sounds: Normal breath sounds. Abdominal:      General: Bowel sounds are normal.      Palpations: There is no mass. Tenderness: There is no abdominal tenderness. There is no guarding. Musculoskeletal:         General: No tenderness. Normal range of motion. Skin:     General: Skin is warm and dry. Capillary Refill: Capillary refill takes less than 2 seconds. Neurological:      Mental Status: She is alert and oriented to person, place, and time. Psychiatric:         Mood and Affect: Mood normal.         Thought Content:  Thought content normal.      Comments: PAOLA Vazquez

## 2023-11-29 NOTE — PSYCH
Virtual Regular Visit    Verification of patient location:    Patient is located in the following state in which I hold an active license [place]    Problem List Items Addressed This Visit     Bipolar 1 disorder [facility]) - Primary          Encounter provider Ritesh Son, [address] [facility]    Provider located at    [id number] [facility]  [address] [address] [phone]    Recent Visits  Date Type Provider Dept   11/28/23 [address] [facility] CELIA Alfonso, [address] [place] [address]  recent visits within past 7 days and meeting all other requirements  Future Appointments  No visits were found meeting these conditions. Showing future appointments within next 150 days and meeting all other requirements           The patient was identified by name and date of birth. Patient was informed that this is a telemedicine visit and [facility] She agrees to proceed. .  My office door was closed. No one else was in the room. She acknowledged consent and understanding of privacy and security of the video platform. The patient has agreed to participate and understands they can discontinue the visit at any time. Patient is aware this is a billable service. HPI     Current Outpatient Medications   Medication Sig Dispense Refill   • Prenatal Vit-Fe Fumarate-FA (PRENATAL VITAMIN PO) Take by mouth       No current facility-administered medications for this visit. Review of Systems  Video Exam    There were no vitals filed for this visit. Physical Exam   As a result of this visit, I have referred the patient for further respiratory evaluation. No    I spent 25 minutes directly with the patient during this visit  VIRTUAL VISIT DISCLAIMER    Radha Charlotte acknowledges that she has consented to an online visit or consultation.  She understands that the online visit is based solely on information provided by her, and that, in the absence of a face-to-face physical evaluation by the physician, the diagnosis she receives is both limited and provisional in terms of accuracy and completeness. This is not intended to replace a full medical face-to-face evaluation by the physician. Carlyle Elliott understands and accepts these terms. MEDICATION MANAGEMENT NOTE        [facility] [place]    Name and Date of Birth:  Carlyle Elliott 29 y.o. 1995 MRN: [id number]    Date of Visit: November 29, 2023    Allergies   Allergen Reactions   • Dust Mite Extract Hives       Visit Time    Visit Start Time: 1721  Visit Stop Time: 8656  Total Visit Duration:  25 minutes    SUBJECTIVE:    Catrachito Bowser is seen today for a follow up for Bipolar Disorder. She continues to experience on and off symptoms since the last visit. Mal Gonzalez seen virtually today for medication management follow up. She was last seen for a brief virtual visit on 10/30/23. She states that she has been doing not great since the last time we spoke as her  has been incarcerated for violating probation. She states that she has been dealing with the  and trying to help him and she has not been sleeping. She is very hopeful that she will be able to restart the depakote at some point as it has been the only medication that has made her feel better. She was strongly encouraged again to make an appointment with a PCP as she needs to have her liver looked at and find out why her enzymes are so high. She states she will make an appointment today. She denies any SI/HI. She continues to have impulsive anger. She is very motivated for treatment and wants to feel better. She denies any alcohol or drug use and the only possible indication that could have effected her liver would be the excessive amount of benadryl that she was taking - up to 5 tablets per night- to try to sleep.   She has a lot of depression and anxiety at this time.  This provider did originally order zyprexa, but discontinued it as it also has an effect on the liver as well. Jackie Boyer was advised not to take it and she was agreeable. She will wait until she sees her PCP and we will discuss further action after she has that appointment. **Prior to this provider finishing this note, she did let this provider know she was able to get an appointment for 11/30/23. She will follow up after the appointment. She denies any side effects from current psychiatric medications. PLAN:  Stop all medications until liver has been evaluated by PCP  She will follow up after PCP appointment  She will call sooner with concerns or issues if they arise prior to scheduled appointment  Aware of 24 hour and weekend coverage for urgent situations accessed by calling [facility] main practice number  Referral for individual psychotherapy  Medication management every asap after doctor appointment . Follows with family physician for elevated liver enzymes  Aware of need to follow up with family physician for medical issues    Diagnoses and all orders for this visit:    Bipolar 1 disorder ([address])  -     Discontinue: OLANZapine (ZyPREXA) 2.5 mg tablet; Take 1 tablet (2.5 mg total) by mouth daily at bedtime        Current Outpatient Medications on File Prior to Visit   Medication Sig Dispense Refill   • Prenatal Vit-Fe Fumarate-FA (PRENATAL VITAMIN PO) Take by mouth       No current facility-administered medications on file prior to visit. Psychotherapy Provided:     Individual psychotherapy provided: Yes  Supportive counseling provided. Medication changes discussed with Lucille Dennison. Medication education provided to Lucille Dennison. Discussed with Lucille Dennison coping with ongoing anxiety and difficulty with anger management. Coping strategies reviewed with Lucille Dennison. Importance of medication and treatment compliance reviewed with Lucille Dennison.   Importance of follow up with family physician for medical issues reviewed with Nimisha Hooper. Reassurance and supportive therapy provided. Crisis/safety plan discussed with Nimisha Hooper. Patient will call prior to scheduled appointment if they have any issues or concerns. Patient understands they can access the office by calling the main number at any time if they are in crisis. They also understand they can call their [facility]'s crisis number or go to their nearest ED if suicidal ideation increases or if they develop a plan or intent. HPI ROS Appetite Changes and Sleep:     She reports difficulty falling asleep, frequent awakenings, adequate appetite, adequate energy level.  Denies homicidal ideation, denies suicidal ideation    Review Of Systems:      HPI ROS:               Medication Side Effects:  N/a   Depression (10 worst): high   Anxiety (10 worst): high   Safety concerns (SI, HI, etc): denies   Sleep: poor   Energy: adequate   Appetite: adequate     General emotional problems   Personality no change in personality   Constitutional negative   ENT negative   Cardiovascular negative   Respiratory negative   Gastrointestinal negative   Genitourinary negative   Musculoskeletal negative   Integumentary negative   Neurological negative   Endocrine negative   Other Symptoms none, all other systems are negative     Mental Status Evaluation:    Appearance Appropriately dressed and Good eye contact   Behavior calm and cooperative   Mood anxious, depressed, irritable, and angry  Depression Scale -  high  of 10 (0 = No depression)  Anxiety Scale -  high  of 10 (0 = No anxiety)   Speech Normal rate and volume   Affect mood-congruent   Thought Processes Goal directed and coherent   Thought Content Does not verbalize delusional material   Associations Tightly connected   Perceptual Disturbances Denies hallucinations and does not appear to be responding to internal stimuli   Risk Potential Suicidal/Homicidal Ideation - No evidence of suicidal or homicidal ideation and patient does not verbalize suicidal or homicidal ideation  Risk of Violence - No evidence of risk for violence found on assessment  Risk of Self Mutilation - No evidence of risk for self mutilation found on assessment   Orientation oriented to person, place, time/date, and situation   Memory recent and remote memory grossly intact   Consciousness alert and awake   Attention/Concentration attention span and concentration are age appropriate   Insight intact   Judgement intact   Muscle Strength and Gait normal muscle strength and normal muscle tone, normal gait/station and normal balance   Motor Activity no abnormal movements   Language no difficulty naming common objects, no difficulty repeating a phrase, no difficulty writing a sentence   Fund of Knowledge adequate knowledge of current events  adequate fund of knowledge regarding past history  adequate fund of knowledge regarding vocabulary      Past Psychiatric History Update:     Inpatient Psychiatric Admission Since Last Encounter:   no  Changes to Outpatient Psychiatric Treatment Team:    no  Suicide Attempt Or Self Mutilation Since Last Encounter:   no  Incidence of Violent Behavior Since Last Encounter:   no    Traumatic History Update:     New Onset of Abuse Since Last Encounter:   no  Traumatic Events Since Last Encounter:   no    Past Medical History:    Past Medical History:   Diagnosis Date   • Anxiety    • Depression     bipolar-serroquil   • Miscarriage     miscarriage 2022-no D & C, 1 termination 2017   • Self-injurious behavior    • Varicella     had vaccines        Past Surgical History:   Procedure Laterality Date   • MYRINGOTOMY W/ TUBES      age 3   • TEAR DUCT SURGERY  1998     Allergies   Allergen Reactions   • Dust Mite Extract Hives     Substance Abuse History:    Social History     Substance and Sexual Activity   Alcohol Use Not Currently    Comment: none with pregnancy     Social History     Substance and Sexual Activity   Drug Use Never    Comment: denies self or fiance- denies family hx except dad was alcoholic     Social History:    Social History     Socioeconomic History   • Marital status:      Spouse name: Not on file   • Number of children: 1   • Years of education: some college   • Highest education level: Some college, no degree   Occupational History     Employer: [employer]   Tobacco Use   • Smoking status: Never   • Smokeless tobacco: Never   Vaping Use   • Vaping Use: Never used   Substance and Sexual Activity   • Alcohol use: Not Currently     Comment: none with pregnancy   • Drug use: Never     Comment: denies self or fiance- denies family hx except dad was alcoholic   • Sexual activity: Yes     Partners: Male     Birth control/protection: None   Other Topics Concern   • Not on file   Social History Narrative   • Not on file     Social Determinants of Health     Financial Resource Strain: Not on file   Food Insecurity: Not on file   Transportation Needs: Not on file   Physical Activity: Inactive (9/21/2021)    Exercise Vital Sign    • Days of Exercise per Week: 0 days    • Minutes of Exercise per Session: 0 min   Stress: Stress Concern Present (9/21/2021)    [address] [place] [address]    • Feeling of Stress : Very much   Social Connections: Not on file   Intimate Partner Violence: Not on file   Housing Stability: Not on file     Family Psychiatric History:     Family History   Problem Relation Age of Onset   • Supraventricular tachycardia Mother    • Ulcerative colitis Mother    • Anxiety disorder Father    • Atrial fibrillation Father    • Alcohol abuse Father    • No Known Problems Brother    • Colon polyps Maternal Grandfather    • Leukemia Paternal Grandmother    • Acute myelogenous leukemia Paternal Grandmother      History Review: The following portions of the patient's history were reviewed and updated as appropriate: allergies, current medications, past family history, past medical history, past social history, past surgical history, and problem list     OBJECTIVE:     Vital signs in last 24 hours: There were no vitals filed for this visit. Laboratory Results: Recent Labs (last 2 months):   Lab on 11/17/2023   Component Date Value   • Sodium 11/17/2023 139    • Potassium 11/17/2023 4.6    • Chloride 11/17/2023 106    • CO2 11/17/2023 29    • ANION GAP 11/17/2023 4    • BUN 11/17/2023 7    • Creatinine 11/17/2023 0.83    • Glucose, Fasting 11/17/2023 88    • Calcium 11/17/2023 9.7    • AST 11/17/2023 100 (H)    • ALT 11/17/2023 119 (H)    • Alkaline Phosphatase 11/17/2023 134 (H)    • Total Protein 11/17/2023 7.3    • Albumin 11/17/2023 4.3    • Total Bilirubin 11/17/2023 0.28    • eGFR 11/17/2023 96    • Valproic Acid, Total 11/17/2023 35 (L)      I have personally reviewed all pertinent laboratory/tests results. Suicide/Homicide Risk Assessment:    Risk of Harm to Self:  The following ratings are based on assessment at the time of the interview  Recent Specific Risk Factors include: current depressive symptoms, current anxiety symptoms, unstable mood, feelings of guilt or self blame  Demographic risk factors include:   Historical Risk Factors include: chronic depression, chronic anxiety symptoms, chronic mood disorder, history of impulsive behaviors, history of traumatic experiences, history of violence  Protective Factors: no current suicidal ideation, access to mental health treatment, being a parent, being , compliant with medications, compliant with mental health treatment, medical compliance, stable living environment, sense of determination, sobriety, supportive family  Weapons: none. The following steps have been taken to ensure weapons are properly secured: not applicable  Based on today's assessment, Josue Cousin presents the following risk of harm to self: low    Risk of Harm to Others:   The following ratings are based on assessment at the time of the interview  Protective Factors: no current homicidal ideation  Based on today's assessment, Lucille Dennison presents the following risk of harm to others: none    The following interventions are recommended: contracts for safety at present - agrees to go to ED if feeling unsafe, return in 2 weeks for reassessment, contracts for safety at present - agrees to call Crisis Intervention Service if feeling unsafe    Medications Risks/Benefits:      Risks, Benefits And Possible Side Effects Of Medications:    Discussed risks and benefits of treatment with patient including :N/A     Controlled Medication Discussion:     Not applicable    Treatment Plan:    Due for update/Updated:   no  Last treatment plan done 10/16/23 by PAOLA Gutierres. Treatment Plan due on 4/16/24. PAOLA Hernandez 11/29/23    This note was shared with patient.

## 2023-12-01 ENCOUNTER — TELEPHONE (OUTPATIENT)
Dept: PSYCHIATRY | Facility: CLINIC | Age: 28
End: 2023-12-01

## 2023-12-01 NOTE — TELEPHONE ENCOUNTER
Nichelle's patient. Claudeen Bank took her off all her medication until she could talk with the PCP.       Yessenia Moreno has not heard from her and is having problems sleeping

## 2023-12-05 ENCOUNTER — APPOINTMENT (OUTPATIENT)
Age: 28
End: 2023-12-05
Payer: COMMERCIAL

## 2023-12-05 DIAGNOSIS — R74.8 ELEVATED LIVER ENZYMES: ICD-10-CM

## 2023-12-05 LAB
ALBUMIN SERPL BCP-MCNC: 4.5 G/DL (ref 3.5–5)
ALP SERPL-CCNC: 132 U/L (ref 34–104)
ALT SERPL W P-5'-P-CCNC: 58 U/L (ref 7–52)
ANION GAP SERPL CALCULATED.3IONS-SCNC: 9 MMOL/L
AST SERPL W P-5'-P-CCNC: 41 U/L (ref 13–39)
BILIRUB SERPL-MCNC: 0.43 MG/DL (ref 0.2–1)
BUN SERPL-MCNC: 17 MG/DL (ref 5–25)
CALCIUM SERPL-MCNC: 9.7 MG/DL (ref 8.4–10.2)
CHLORIDE SERPL-SCNC: 103 MMOL/L (ref 96–108)
CO2 SERPL-SCNC: 27 MMOL/L (ref 21–32)
CREAT SERPL-MCNC: 0.84 MG/DL (ref 0.6–1.3)
GFR SERPL CREATININE-BSD FRML MDRD: 94 ML/MIN/1.73SQ M
GLUCOSE P FAST SERPL-MCNC: 78 MG/DL (ref 65–99)
POTASSIUM SERPL-SCNC: 4.5 MMOL/L (ref 3.5–5.3)
PROT SERPL-MCNC: 7.6 G/DL (ref 6.4–8.4)
SODIUM SERPL-SCNC: 139 MMOL/L (ref 135–147)

## 2023-12-05 PROCEDURE — 36415 COLL VENOUS BLD VENIPUNCTURE: CPT

## 2023-12-05 PROCEDURE — 80053 COMPREHEN METABOLIC PANEL: CPT

## 2023-12-07 ENCOUNTER — DOCUMENTATION (OUTPATIENT)
Dept: PSYCHIATRY | Facility: CLINIC | Age: 28
End: 2023-12-07

## 2023-12-08 DIAGNOSIS — F31.9 BIPOLAR 1 DISORDER (HCC): Primary | ICD-10-CM

## 2023-12-08 RX ORDER — LITHIUM CARBONATE 300 MG/1
300 TABLET, FILM COATED, EXTENDED RELEASE ORAL
Qty: 30 TABLET | Refills: 0 | Status: SHIPPED | OUTPATIENT
Start: 2023-12-08

## 2023-12-08 NOTE — PROGRESS NOTES
Spoke with Daphne Cuba. Her liver enzymes continue to be elevated as of last weekend so we will hold off on the depakote as planned. At this time, we will initiate Lithium instead as it is renally excreted as opposed to hepatic. She is agreeable to starting 300mg PO HS at this time. We will follow up on 12/26 and re-assess. She will obtain labs following that appointment and we will increase the dosage if she is tolerating the initial dose.

## 2023-12-18 DIAGNOSIS — R74.8 ELEVATED LIVER ENZYMES: Primary | ICD-10-CM

## 2023-12-26 ENCOUNTER — TELEMEDICINE (OUTPATIENT)
Dept: PSYCHIATRY | Facility: CLINIC | Age: 28
End: 2023-12-26
Payer: COMMERCIAL

## 2023-12-26 DIAGNOSIS — F31.9 BIPOLAR 1 DISORDER (HCC): Primary | ICD-10-CM

## 2023-12-26 PROCEDURE — 99214 OFFICE O/P EST MOD 30 MIN: CPT | Performed by: NURSE PRACTITIONER

## 2023-12-26 RX ORDER — LITHIUM CARBONATE 300 MG/1
300 TABLET, FILM COATED, EXTENDED RELEASE ORAL 2 TIMES DAILY
Qty: 60 TABLET | Refills: 1 | Status: SHIPPED | OUTPATIENT
Start: 2023-12-26

## 2023-12-27 ENCOUNTER — LAB (OUTPATIENT)
Dept: LAB | Facility: HOSPITAL | Age: 28
End: 2023-12-27
Payer: COMMERCIAL

## 2023-12-27 ENCOUNTER — HOSPITAL ENCOUNTER (OUTPATIENT)
Dept: ULTRASOUND IMAGING | Facility: CLINIC | Age: 28
Discharge: HOME | End: 2023-12-27
Payer: COMMERCIAL

## 2023-12-27 DIAGNOSIS — R74.8 ELEVATED LIVER ENZYMES: ICD-10-CM

## 2023-12-27 DIAGNOSIS — F31.9 BIPOLAR 1 DISORDER (HCC): ICD-10-CM

## 2023-12-27 LAB
ALBUMIN SERPL BCP-MCNC: 4.3 G/DL (ref 3.5–5)
ALP SERPL-CCNC: 90 U/L (ref 34–104)
ALT SERPL W P-5'-P-CCNC: 45 U/L (ref 7–52)
AST SERPL W P-5'-P-CCNC: 36 U/L (ref 13–39)
BILIRUB DIRECT SERPL-MCNC: 0.11 MG/DL (ref 0–0.2)
BILIRUB SERPL-MCNC: 0.57 MG/DL (ref 0.2–1)
LITHIUM SERPL-SCNC: 0.3 MMOL/L (ref 0.6–1.2)
PROT SERPL-MCNC: 7.4 G/DL (ref 6.4–8.4)
T4 FREE SERPL-MCNC: 0.77 NG/DL (ref 0.61–1.12)
TSH SERPL DL<=0.05 MIU/L-ACNC: 4.67 UIU/ML (ref 0.45–4.5)

## 2023-12-27 PROCEDURE — 84439 ASSAY OF FREE THYROXINE: CPT

## 2023-12-27 PROCEDURE — 80076 HEPATIC FUNCTION PANEL: CPT

## 2023-12-27 PROCEDURE — 80178 ASSAY OF LITHIUM: CPT

## 2023-12-27 PROCEDURE — 36415 COLL VENOUS BLD VENIPUNCTURE: CPT

## 2023-12-27 PROCEDURE — 76700 US EXAM ABDOM COMPLETE: CPT

## 2023-12-27 PROCEDURE — 84443 ASSAY THYROID STIM HORMONE: CPT

## 2023-12-27 NOTE — PSYCH
Virtual Regular Visit    Verification of patient location:    Patient is located in the following state in which I hold an active license [place]    Problem List Items Addressed This Visit     Bipolar 1 disorder (HCC) - Primary    Relevant Medications    lithium carbonate (LITHOBID) 300 mg CR tablet    Other Relevant Orders    Lithium level (Completed)    TSH, 3rd generation with Free T4 reflex (Completed)          Encounter provider PAOLA Campbell    Provider located at    [facility]  [address] [facility] [place] [address] [phone]    Recent Visits  Date Type Provider Dept   12/26/23 Telemedicine PAOLA Campbell [facility]   Showing recent visits within past 7 days and meeting all other requirements  Future Appointments  No visits were found meeting these conditions.  Showing future appointments within next 150 days and meeting all other requirements           The patient was identified by name and date of birth. Patient was informed that this is a telemedicine visit and that the visit is being conducted throughthe Epic Embedded platform. She agrees to proceed..  My office door was closed. No one else was in the room.  She acknowledged consent and understanding of privacy and security of the video platform. The patient has agreed to participate and understands they can discontinue the visit at any time.    Patient is aware this is a billable service.     HPI     Current Outpatient Medications   Medication Sig Dispense Refill   • lithium carbonate (LITHOBID) 300 mg CR tablet Take 1 tablet (300 mg total) by mouth 2 (two) times a day 60 tablet 1   • Prenatal Vit-Fe Fumarate-FA (PRENATAL VITAMIN PO) Take by mouth (Patient not taking: Reported on 11/29/2023)       No current facility-administered medications for this visit.       Review of Systems  Video Exam    There were no vitals filed for this visit.    Physical Exam   As a  result of this visit, I have referred the patient for further respiratory evaluation. No    I spent 20 minutes directly with the patient during this visit  VIRTUAL VISIT DISCLAIMER    Salina Coates acknowledges that she has consented to an online visit or consultation. She understands that the online visit is based solely on information provided by her, and that, in the absence of a face-to-face physical evaluation by the physician, the diagnosis she receives is both limited and provisional in terms of accuracy and completeness. This is not intended to replace a full medical face-to-face evaluation by the physician. Salina Coates understands and accepts these terms.    MEDICATION MANAGEMENT NOTE        [facility]    Name and Date of Birth:  Salina Coates 28 y.o. 1995 MRN: [id number]    Date of Visit: December 26, 2023    Allergies   Allergen Reactions   • Dust Mite Extract Hives       Visit Time    Visit Start Time: 1030  Visit Stop Time: 1050  Total Visit Duration:  20 minutes    SUBJECTIVE:    Salina is seen today for a follow up for Bipolar Disorder. She continues to experience ongoing symptoms since the last visit. Linda seen virtually today for medication management follow up. She was last seen for a brief virtual visit on 11/28/23.  She states that she has not felt like the lithium has been doing anything since starting last month. She reports that she still is not sleeping. She reports that she is having a hepatic u/s done on 12/27 along with labs.  She is agreeable to increasing the lithium at this time, as she does not report any side effects.  We will add a TSH to her lab orders at this time as well. She denies SI/HI. Denies auditory and visual hallucinations. She will follow up in 2 weeks.     She denies any side effects from current psychiatric medications.    PLAN:  She will increase Lithium to 300mg PO BID  Follow up in 2 weeks  She will  continue to follow up with medical provider re: hepatic issues  She will call sooner with concerns or issues if they arise prior to scheduled appointment  Aware of 24 hour and weekend coverage for urgent situations accessed by calling [facility] main practice number  Referral for individual psychotherapy  Medication management every 2 weeks  Aware of need to follow up with family physician for medical issues    Diagnoses and all orders for this visit:    Bipolar 1 disorder (HCC)  -     lithium carbonate (LITHOBID) 300 mg CR tablet; Take 1 tablet (300 mg total) by mouth 2 (two) times a day  -     Lithium level; Future  -     TSH, 3rd generation with Free T4 reflex; Future        Current Outpatient Medications on File Prior to Visit   Medication Sig Dispense Refill   • Prenatal Vit-Fe Fumarate-FA (PRENATAL VITAMIN PO) Take by mouth (Patient not taking: Reported on 11/29/2023)       No current facility-administered medications on file prior to visit.       Psychotherapy Provided:     Individual psychotherapy provided: Yes  Supportive counseling provided.  Medication changes discussed with Salina.  Medication education provided to Salina.  Discussed with Salina coping with ongoing anxiety and difficulty with anger management.   Coping strategies reviewed with Salina.   Importance of medication and treatment compliance reviewed with Salina.  Importance of follow up with family physician for medical issues reviewed with Salina.  Reassurance and supportive therapy provided.   Crisis/safety plan discussed with Salina. Patient will call prior to scheduled appointment if they have any issues or concerns.  Patient understands they can access the office by calling the main number at any time if they are in crisis.  They also understand they can call their [facility]'s crisis number or go to their nearest ED if suicidal ideation increases or if they develop a plan or intent.       HPI ROS  Appetite Changes and Sleep:     She reports difficulty falling asleep, frequent awakenings, adequate appetite, adequate energy level. Denies homicidal ideation, denies suicidal ideation    Review Of Systems:      HPI ROS:               Medication Side Effects:   N/a   Depression (10 worst): No change high   Anxiety (10 worst): No change high   Safety concerns (SI, HI, etc): denies denies   Sleep: poor poor   Energy: adequate adequate   Appetite: adequate adequate     General emotional problems   Personality no change in personality   Constitutional negative   ENT negative   Cardiovascular negative   Respiratory negative   Gastrointestinal negative   Genitourinary negative   Musculoskeletal negative   Integumentary negative   Neurological negative   Endocrine negative   Other Symptoms none, all other systems are negative     Mental Status Evaluation:    Appearance Appropriately dressed and Good eye contact   Behavior restless and fidgety   Mood anxious and depressed  Depression Scale -  high  of 10 (0 = No depression)  Anxiety Scale -  high  of 10 (0 = No anxiety)   Speech Normal rate and volume   Affect mood-congruent   Thought Processes Goal directed and coherent   Thought Content Does not verbalize delusional material   Associations Tightly connected   Perceptual Disturbances Denies hallucinations and does not appear to be responding to internal stimuli   Risk Potential Suicidal/Homicidal Ideation - No evidence of suicidal or homicidal ideation and patient does not verbalize suicidal or homicidal ideation  Risk of Violence - No evidence of risk for violence found on assessment  Risk of Self Mutilation - No evidence of risk for self mutilation found on assessment   Orientation oriented to person, place, time/date, and situation   Memory recent and remote memory grossly intact   Consciousness alert and awake   Attention/Concentration attention span and concentration are age appropriate   Insight intact   Judgement  intact   Muscle Strength and Gait normal muscle strength and normal muscle tone, normal gait/station and normal balance   Motor Activity no abnormal movements   Language no difficulty naming common objects, no difficulty repeating a phrase, no difficulty writing a sentence   Fund of Knowledge adequate knowledge of current events  adequate fund of knowledge regarding past history  adequate fund of knowledge regarding vocabulary      Past Psychiatric History Update:     Inpatient Psychiatric Admission Since Last Encounter:   no  Changes to Outpatient Psychiatric Treatment Team:    no  Suicide Attempt Or Self Mutilation Since Last Encounter:   no  Incidence of Violent Behavior Since Last Encounter:   no    Traumatic History Update:     New Onset of Abuse Since Last Encounter:   no  Traumatic Events Since Last Encounter:   no    Past Medical History:    Past Medical History:   Diagnosis Date   • 24 weeks gestation of pregnancy 04/26/2023    Blood Type: O positive. Antibody negative  Pap 04/21/2021 neg  GC/CT -  neg  PN1 Labs- wnl   28 Week Labs-  COVID vaccine- x2  Flu vaccine - completed  Blue folder- reviewed     Genetic screening- NIPS low  AFP- Neg  Level 1- 5/08  Level 2- 6/27     Yellow folder-  TDAP -   Delivery consent-  Breast pump -   Pediatrician -     Perineal massage -  GBS swab -   IOL -   • Anxiety    • Depression     bipolar-serroquil   • Miscarriage     miscarriage 2022-no D & C, 1 termination 2017   • Self-injurious behavior    • Varicella     had vaccines        Past Surgical History:   Procedure Laterality Date   • MYRINGOTOMY W/ TUBES      age 4   • TEAR DUCT SURGERY  1998     Allergies   Allergen Reactions   • Dust Mite Extract Hives     Substance Abuse History:    Social History     Substance and Sexual Activity   Alcohol Use Not Currently    Comment: none with pregnancy     Social History     Substance and Sexual Activity   Drug Use Never    Comment: denies self or fiance- denies family hx except  dad was alcoholic     Social History:    Social History     Socioeconomic History   • Marital status:      Spouse name: Not on file   • Number of children: 1   • Years of education: some college   • Highest education level: Some college, no degree   Occupational History     Employer: [employer]   Tobacco Use   • Smoking status: Never   • Smokeless tobacco: Never   Vaping Use   • Vaping status: Never Used   Substance and Sexual Activity   • Alcohol use: Not Currently     Comment: none with pregnancy   • Drug use: Never     Comment: denies self or fiance- denies family hx except dad was alcoholic   • Sexual activity: Yes     Partners: Male     Birth control/protection: None   Other Topics Concern   • Not on file   Social History Narrative   • Not on file     Social Determinants of Health     Financial Resource Strain: Not on file   Food Insecurity: Not on file   Transportation Needs: Not on file   Physical Activity: Inactive (9/21/2021)    Exercise Vital Sign    • Days of Exercise per Week: 0 days    • Minutes of Exercise per Session: 0 min   Stress: Stress Concern Present (9/21/2021)    [employer] [place] [employer] - Occupational Stress Questionnaire    • Feeling of Stress : Very much   Social Connections: Unknown (8/14/2023)    Received from [facility]    Social Connections    • Frequency of Communication with Friends and Family: Not asked    • Frequency of Social Gatherings with Friends and Family: Not asked   Intimate Partner Violence: Unknown (8/14/2023)    Received from [facility]    Intimate Partner Violence    • Fear of Current or Ex-Partner: Not asked    • Emotionally Abused: Not asked    • Physically Abused: Not asked    • Sexually Abused: Not asked   Housing Stability: Not on file     Family Psychiatric History:     Family History   Problem Relation Age of Onset   • Supraventricular tachycardia Mother    • Ulcerative colitis Mother    • Anxiety disorder Father    • Atrial  fibrillation Father    • Alcohol abuse Father    • No Known Problems Brother    • Colon polyps Maternal Grandfather    • Leukemia Paternal Grandmother    • Acute myelogenous leukemia Paternal Grandmother      History Review:The following portions of the patient's history were reviewed and updated as appropriate: allergies, current medications, past family history, past medical history, past social history, past surgical history, and problem list     OBJECTIVE:     Vital signs in last 24 hours:    There were no vitals filed for this visit.  Laboratory Results: Recent Labs (last 2 months):   Appointment on 12/05/2023   Component Date Value   • Sodium 12/05/2023 139    • Potassium 12/05/2023 4.5    • Chloride 12/05/2023 103    • CO2 12/05/2023 27    • ANION GAP 12/05/2023 9    • BUN 12/05/2023 17    • Creatinine 12/05/2023 0.84    • Glucose, Fasting 12/05/2023 78    • Calcium 12/05/2023 9.7    • AST 12/05/2023 41 (H)    • ALT 12/05/2023 58 (H)    • Alkaline Phosphatase 12/05/2023 132 (H)    • Total Protein 12/05/2023 7.6    • Albumin 12/05/2023 4.5    • Total Bilirubin 12/05/2023 0.43    • eGFR 12/05/2023 94    Lab on 11/17/2023   Component Date Value   • Sodium 11/17/2023 139    • Potassium 11/17/2023 4.6    • Chloride 11/17/2023 106    • CO2 11/17/2023 29    • ANION GAP 11/17/2023 4    • BUN 11/17/2023 7    • Creatinine 11/17/2023 0.83    • Glucose, Fasting 11/17/2023 88    • Calcium 11/17/2023 9.7    • AST 11/17/2023 100 (H)    • ALT 11/17/2023 119 (H)    • Alkaline Phosphatase 11/17/2023 134 (H)    • Total Protein 11/17/2023 7.3    • Albumin 11/17/2023 4.3    • Total Bilirubin 11/17/2023 0.28    • eGFR 11/17/2023 96    • Valproic Acid, Total 11/17/2023 35 (L)      I have personally reviewed all pertinent laboratory/tests results.    Suicide/Homicide Risk Assessment:    Risk of Harm to Self:  The following ratings are based on assessment at the time of the interview  Recent Specific Risk Factors include: current  depressive symptoms, current anxiety symptoms, unstable mood, feelings of guilt or self blame  Demographic risk factors include:   Historical Risk Factors include: chronic depression, chronic anxiety symptoms, chronic mood disorder, history of impulsive behaviors, history of traumatic experiences, history of violence  Protective Factors: no current suicidal ideation, access to mental health treatment, being a parent, being , compliant with medications, compliant with mental health treatment, medical compliance, stable living environment, sense of determination, sobriety, supportive family  Weapons: none. The following steps have been taken to ensure weapons are properly secured: not applicable  Based on today's assessment, Salina presents the following risk of harm to self: low    Risk of Harm to Others:  The following ratings are based on assessment at the time of the interview  Protective Factors: no current homicidal ideation  Based on today's assessment, Salina presents the following risk of harm to others: none    The following interventions are recommended: contracts for safety at present - agrees to go to ED if feeling unsafe, return in 2 weeks for reassessment, contracts for safety at present - agrees to call Crisis Intervention Service if feeling unsafe    Medications Risks/Benefits:      Risks, Benefits And Possible Side Effects Of Medications:    Discussed risks and benefits of treatment with patient including :N/A     Controlled Medication Discussion:     Not applicable    Treatment Plan:    Due for update/Updated:   no  Last treatment plan done 10/16/23 by PAOLA Gill.  Treatment Plan due on 4/16/24.    PAOLA Campbell 12/27/23    This note was shared with patient.

## 2024-01-10 ENCOUNTER — TELEMEDICINE (OUTPATIENT)
Dept: PSYCHIATRY | Facility: CLINIC | Age: 29
End: 2024-01-10
Payer: COMMERCIAL

## 2024-01-10 DIAGNOSIS — F31.9 BIPOLAR 1 DISORDER (HCC): Primary | ICD-10-CM

## 2024-01-10 PROCEDURE — 99214 OFFICE O/P EST MOD 30 MIN: CPT | Performed by: NURSE PRACTITIONER

## 2024-01-10 RX ORDER — DIVALPROEX SODIUM 250 MG/1
250 TABLET, EXTENDED RELEASE ORAL
Qty: 30 TABLET | Refills: 0 | Status: SHIPPED | OUTPATIENT
Start: 2024-01-10

## 2024-01-12 NOTE — PSYCH
Virtual Regular Visit    Verification of patient location:    Patient is located in the following state in which I hold an active license [place]    Problem List Items Addressed This Visit     Bipolar 1 disorder (HCC) - Primary    Relevant Medications    divalproex sodium (Depakote ER) 250 mg 24 hr tablet    Other Relevant Orders    Comprehensive metabolic panel    Valproic acid level, total          Encounter provider PAOLA Campbell    Provider located at    [facility]  [address] [place] [address] [phone]    Recent Visits  Date Type Provider Dept   01/10/24 Telemedicine PAOLA Campbell [facility]   Showing recent visits within past 7 days and meeting all other requirements  Future Appointments  No visits were found meeting these conditions.  Showing future appointments within next 150 days and meeting all other requirements           The patient was identified by name and date of birth. Patient was informed that this is a telemedicine visit and that the visit is being conducted throughthe Epic Embedded platform. She agrees to proceed..  My office door was closed. No one else was in the room.  She acknowledged consent and understanding of privacy and security of the video platform. The patient has agreed to participate and understands they can discontinue the visit at any time.    Patient is aware this is a billable service.     HPI     Current Outpatient Medications   Medication Sig Dispense Refill   • divalproex sodium (Depakote ER) 250 mg 24 hr tablet Take 1 tablet (250 mg total) by mouth daily at bedtime 30 tablet 0   • Prenatal Vit-Fe Fumarate-FA (PRENATAL VITAMIN PO) Take by mouth (Patient not taking: Reported on 11/29/2023)       No current facility-administered medications for this visit.       Review of Systems  Video Exam    There were no vitals filed for this visit.    Physical Exam   As a result of  this visit, I have referred the patient for further respiratory evaluation. No    I spent 25 minutes directly with the patient during this visit  VIRTUAL VISIT DISCLAIMER    Salina Coates acknowledges that she has consented to an online visit or consultation. She understands that the online visit is based solely on information provided by her, and that, in the absence of a face-to-face physical evaluation by the physician, the diagnosis she receives is both limited and provisional in terms of accuracy and completeness. This is not intended to replace a full medical face-to-face evaluation by the physician. Salina Coates understands and accepts these terms.    MEDICATION MANAGEMENT NOTE        [facility]    Name and Date of Birth:  Salina Coates 28 y.o. 1995 MRN: [id number]    Date of Visit: January 10, 2024    Allergies   Allergen Reactions   • Dust Mite Extract Hives       Visit Time    Visit Start Time: 0930   Visit Stop Time: 0955  Total Visit Duration:  25 minutes    SUBJECTIVE:    Salina is seen today for a follow up for Bipolar Disorder. She continues to experience ongoing symptoms since the last visit. Linda seen virtually today for medication management follow up. Linda completed her liver work up and received confirmation from her PCP that she could retry the depakote at this time.  No hepatic dysfunction has been found and her ultrasound was WNL.  She continues to have poor sleep, only getting between 2-3 hrs/ night and states that her anger and irritability have not improved on the lithium.  She denies any depression and states that her new job is going well so far.  She states her  is due to get out of [employer] on 3/14.  At this time, we will d/c the Lithium due to ineffectiveness and restart the depakote 250mg PO HS.  She will have labs completed within 2 weeks prior to her next appointment to recheck liver function.  She is agreeable and  verbalizes understanding at this time.      Current Rating Scores:     Current PHQ-9   PHQ-2/9 Depression Screening    Little interest or pleasure in doing things: 1 - several days  Feeling down, depressed, or hopeless: 1 - several days  Trouble falling or staying asleep, or sleeping too much: 3 - nearly every day  Feeling tired or having little energy: 0 - not at all  Poor appetite or overeatin - not at all  Feeling bad about yourself - or that you are a failure or have let yourself or your family down: 0 - not at all  Trouble concentrating on things, such as reading the newspaper or watching television: 0 - not at all  Moving or speaking so slowly that other people could have noticed. Or the opposite - being so fidgety or restless that you have been moving around a lot more than usual: 0 - not at all  Thoughts that you would be better off dead, or of hurting yourself in some way: 0 - not at all  PHQ-9 Score: 5  PHQ-9 Interpretation: Mild depression           She denies any side effects from current psychiatric medications.    PLAN:  D/C lithium.  Initiate depakote 250mg PO HS  CMP and valproic acid level due prior to next appointment  Follow up in 2 weeks  She will continue to follow up with medical provider re: hepatic issues  She will call sooner with concerns or issues if they arise prior to scheduled appointment  Aware of 24 hour and weekend coverage for urgent situations accessed by calling [facility] main practice number  Referral for individual psychotherapy  Medication management every 2 weeks  Aware of need to follow up with family physician for medical issues    Diagnoses and all orders for this visit:    Bipolar 1 disorder (HCC)  -     divalproex sodium (Depakote ER) 250 mg 24 hr tablet; Take 1 tablet (250 mg total) by mouth daily at bedtime  -     Comprehensive metabolic panel; Future  -     Valproic acid level, total; Future        Current Outpatient Medications on File  "Prior to Visit   Medication Sig Dispense Refill   • Prenatal Vit-Fe Fumarate-FA (PRENATAL VITAMIN PO) Take by mouth (Patient not taking: Reported on 11/29/2023)       No current facility-administered medications on file prior to visit.       Psychotherapy Provided:     Individual psychotherapy provided: Yes  Supportive counseling provided.  Medication changes discussed with Salina.  Medication education provided to Salina.  Discussed with Salina coping with ongoing anxiety and difficulty with anger management.   Coping strategies reviewed with Salina.   Importance of medication and treatment compliance reviewed with Salina.  Importance of follow up with family physician for medical issues reviewed with Salina.  Reassurance and supportive therapy provided.   Crisis/safety plan discussed with Salina. Patient will call prior to scheduled appointment if they have any issues or concerns.  Patient understands they can access the office by calling the main number at any time if they are in crisis.  They also understand they can call their [facility]'s crisis number or go to their nearest ED if suicidal ideation increases or if they develop a plan or intent.       HPI ROS Appetite Changes and Sleep:     She reports difficulty falling asleep, frequent awakenings, adequate appetite, adequate energy level. Denies homicidal ideation, denies suicidal ideation    Review Of Systems:      HPI ROS:               Medication Side Effects: denies    Depression (10 worst): denies No change   Anxiety (10 worst): \"All right\" No change   Safety concerns (SI, HI, etc): denies denies   Sleep: poor poor   Energy: adequate adequate   Appetite: adequate adequate     General emotional problems   Personality no change in personality   Constitutional as noted in HPI   ENT negative   Cardiovascular negative   Respiratory negative   Gastrointestinal negative   Genitourinary negative   Musculoskeletal negative   Integumentary negative " "  Neurological negative   Endocrine negative   Other Symptoms none, all other systems are negative     Mental Status Evaluation:    Appearance Appropriately dressed and Good eye contact   Behavior restless and fidgety   Mood irritable and angry  Depression Scale - 0 of 10 (0 = No depression)  Anxiety Scale -  \"all right\"  of 10 (0 = No anxiety)   Speech Normal rate and volume   Affect mood-congruent   Thought Processes Goal directed and coherent   Thought Content Does not verbalize delusional material   Associations Tightly connected   Perceptual Disturbances Denies hallucinations and does not appear to be responding to internal stimuli   Risk Potential Suicidal/Homicidal Ideation - No evidence of suicidal or homicidal ideation and patient does not verbalize suicidal or homicidal ideation  Risk of Violence - No evidence of risk for violence found on assessment  Risk of Self Mutilation - No evidence of risk for self mutilation found on assessment   Orientation oriented to person, place, time/date, and situation   Memory recent and remote memory grossly intact   Consciousness alert and awake   Attention/Concentration attention span and concentration are age appropriate   Insight intact   Judgement intact   Muscle Strength and Gait normal muscle strength and normal muscle tone, normal gait/station and normal balance   Motor Activity no abnormal movements   Language no difficulty naming common objects, no difficulty repeating a phrase, no difficulty writing a sentence   Fund of Knowledge adequate knowledge of current events  adequate fund of knowledge regarding past history  adequate fund of knowledge regarding vocabulary      Past Psychiatric History Update:     Inpatient Psychiatric Admission Since Last Encounter:   no  Changes to Outpatient Psychiatric Treatment Team:    no  Suicide Attempt Or Self Mutilation Since Last Encounter:   no  Incidence of Violent Behavior Since Last Encounter:   no    Traumatic History " Update:     New Onset of Abuse Since Last Encounter:   no  Traumatic Events Since Last Encounter:   no    Past Medical History:    Past Medical History:   Diagnosis Date   • 24 weeks gestation of pregnancy 04/26/2023    Blood Type: O positive. Antibody negative  Pap 04/21/2021 neg  GC/CT -  neg  PN1 Labs- wnl   28 Week Labs-  COVID vaccine- x2  Flu vaccine - completed  Blue folder- reviewed     Genetic screening- NIPS low  AFP- Neg  Level 1- 5/08  Level 2- 6/27     Yellow folder-  TDAP -   Delivery consent-  Breast pump -   Pediatrician -     Perineal massage -  GBS swab -   IOL -   • Anxiety    • Depression     bipolar-serroquil   • Miscarriage     miscarriage 2022-no D & C, 1 termination 2017   • Self-injurious behavior    • Varicella     had vaccines        Past Surgical History:   Procedure Laterality Date   • MYRINGOTOMY W/ TUBES      age 4   • TEAR DUCT SURGERY  1998     Allergies   Allergen Reactions   • Dust Mite Extract Hives     Substance Abuse History:    Social History     Substance and Sexual Activity   Alcohol Use Not Currently    Comment: none with pregnancy     Social History     Substance and Sexual Activity   Drug Use Never    Comment: denies self or fiance- denies family hx except dad was alcoholic     Social History:    Social History     Socioeconomic History   • Marital status:      Spouse name: Not on file   • Number of children: 1   • Years of education: some college   • Highest education level: Some college, no degree   Occupational History     Employer: [employer]   Tobacco Use   • Smoking status: Never   • Smokeless tobacco: Never   Vaping Use   • Vaping status: Never Used   Substance and Sexual Activity   • Alcohol use: Not Currently     Comment: none with pregnancy   • Drug use: Never     Comment: denies self or fiance- denies family hx except dad was alcoholic   • Sexual activity: Yes     Partners: Male     Birth control/protection: None   Other Topics Concern   • Not on file    Social History Narrative   • Not on file     Social Determinants of Health     Financial Resource Strain: Not on file   Food Insecurity: Not on file   Transportation Needs: Not on file   Physical Activity: Inactive (9/21/2021)    Exercise Vital Sign    • Days of Exercise per Week: 0 days    • Minutes of Exercise per Session: 0 min   Stress: Stress Concern Present (9/21/2021)    [employer] - Occupational Stress Questionnaire    • Feeling of Stress : Very much   Social Connections: Unknown (8/14/2023)    Received from [employer] [facility]    Social Connections    • Frequency of Communication with Friends and Family: Not asked    • Frequency of Social Gatherings with Friends and Family: Not asked   Intimate Partner Violence: Unknown (8/14/2023)    Received from [employer]    Intimate Partner Violence    • Fear of Current or Ex-Partner: Not asked    • Emotionally Abused: Not asked    • Physically Abused: Not asked    • Sexually Abused: Not asked   Housing Stability: Not on file     Family Psychiatric History:     Family History   Problem Relation Age of Onset   • Supraventricular tachycardia Mother    • Ulcerative colitis Mother    • Anxiety disorder Father    • Atrial fibrillation Father    • Alcohol abuse Father    • No Known Problems Brother    • Colon polyps Maternal Grandfather    • Leukemia Paternal Grandmother    • Acute myelogenous leukemia Paternal Grandmother      History Review:The following portions of the patient's history were reviewed and updated as appropriate: allergies, current medications, past family history, past medical history, past social history, past surgical history, and problem list     OBJECTIVE:     Vital signs in last 24 hours:    There were no vitals filed for this visit.  Laboratory Results: Recent Labs (last 2 months):   Lab on 12/27/2023   Component Date Value   • Total Bilirubin 12/27/2023 0.57    • Bilirubin, Direct 12/27/2023 0.11    • Alkaline  Phosphatase 12/27/2023 90    • AST 12/27/2023 36    • ALT 12/27/2023 45    • Total Protein 12/27/2023 7.4    • Albumin 12/27/2023 4.3    • Lithium Lvl 12/27/2023 0.3 (L)    • TSH 3RD GENERATON 12/27/2023 4.666 (H)    • Free T4 12/27/2023 0.77    Appointment on 12/05/2023   Component Date Value   • Sodium 12/05/2023 139    • Potassium 12/05/2023 4.5    • Chloride 12/05/2023 103    • CO2 12/05/2023 27    • ANION GAP 12/05/2023 9    • BUN 12/05/2023 17    • Creatinine 12/05/2023 0.84    • Glucose, Fasting 12/05/2023 78    • Calcium 12/05/2023 9.7    • AST 12/05/2023 41 (H)    • ALT 12/05/2023 58 (H)    • Alkaline Phosphatase 12/05/2023 132 (H)    • Total Protein 12/05/2023 7.6    • Albumin 12/05/2023 4.5    • Total Bilirubin 12/05/2023 0.43    • eGFR 12/05/2023 94    Lab on 11/17/2023   Component Date Value   • Sodium 11/17/2023 139    • Potassium 11/17/2023 4.6    • Chloride 11/17/2023 106    • CO2 11/17/2023 29    • ANION GAP 11/17/2023 4    • BUN 11/17/2023 7    • Creatinine 11/17/2023 0.83    • Glucose, Fasting 11/17/2023 88    • Calcium 11/17/2023 9.7    • AST 11/17/2023 100 (H)    • ALT 11/17/2023 119 (H)    • Alkaline Phosphatase 11/17/2023 134 (H)    • Total Protein 11/17/2023 7.3    • Albumin 11/17/2023 4.3    • Total Bilirubin 11/17/2023 0.28    • eGFR 11/17/2023 96    • Valproic Acid, Total 11/17/2023 35 (L)      I have personally reviewed all pertinent laboratory/tests results.    Suicide/Homicide Risk Assessment:    Risk of Harm to Self:  The following ratings are based on assessment at the time of the interview  Recent Specific Risk Factors include: unstable mood, feelings of guilt or self blame  Demographic risk factors include:   Historical Risk Factors include: chronic depression, chronic anxiety symptoms, chronic mood disorder, history of impulsive behaviors, history of traumatic experiences, history of violence  Protective Factors: no current suicidal ideation, access to mental health  treatment, being a parent, being , compliant with medications, compliant with mental health treatment, medical compliance, stable living environment, sense of determination, sobriety, supportive family  Weapons: none. The following steps have been taken to ensure weapons are properly secured: not applicable  Based on today's assessment, Salina presents the following risk of harm to self: low    The following interventions are recommended: no intervention changes needed. Although patient's acute lethality risk is LOW, long-term/chronic lethality risk is mildly elevated given chronic mental health symptoms.  Salina Coates is future-oriented, forward-thinking, and demonstrates ability to act in a self-preserving manner as evidenced by volitionally presenting to the clinic today, seeking treatment.  At this time, inpatient hospitalization is not currently warranted. To mitigate future risk, patient should adhere to treatment recommendations, avoid alcohol/illicit substance use, utilize community-based resources and familiar support, and prioritize mental health treatment.      Based on today's assessment and clinical criteria, Salina Coates contracts for safety and is not an imminent risk of harm to self or others. Outpatient level of care is deemed appropriate at this present time.  Salina Coates understands that if they are no longer able to contract for safety, they need to call/contact the outpatient office including this writer, call/contact crisis and/or attend to the nearest Emergency Department for immediate evaluation.      Risk of Harm to Others:  The following ratings are based on assessment at the time of the interview  Protective Factors: no current homicidal ideation  Based on today's assessment, Salina presents the following risk of harm to others: none    The following interventions are recommended: contracts for safety at present - agrees to go to ED if feeling unsafe, return in 2 weeks for  reassessment, contracts for safety at present - agrees to call Crisis Intervention Service if feeling unsafe    Medications Risks/Benefits:      Risks, Benefits And Possible Side Effects Of Medications:    Discussed risks and benefits of treatment with patient including :N/A     Controlled Medication Discussion:     Not applicable    Treatment Plan:    Due for update/Updated:   no  Last treatment plan done 10/16/23 by PAOLA Gill.  Treatment Plan due on 4/16/24.    PAOLA Campbell 01/12/24    This note was shared with patient.

## 2024-06-06 ENCOUNTER — VBI (OUTPATIENT)
Dept: ADMINISTRATIVE | Facility: OTHER | Age: 29
End: 2024-06-06

## 2024-06-19 ENCOUNTER — HOSPITAL ENCOUNTER (EMERGENCY)
Age: 29
Discharge: HOME | End: 2024-06-19

## 2024-06-19 ENCOUNTER — APPOINTMENT (OUTPATIENT)
Dept: ULTRASOUND IMAGING | Age: 29
End: 2024-06-19

## 2024-06-19 VITALS
TEMPERATURE: 98.2 F | BODY MASS INDEX: 20.49 KG/M2 | RESPIRATION RATE: 18 BRPM | WEIGHT: 120 LBS | DIASTOLIC BLOOD PRESSURE: 88 MMHG | HEART RATE: 66 BPM | HEIGHT: 64 IN | OXYGEN SATURATION: 98 % | SYSTOLIC BLOOD PRESSURE: 103 MMHG

## 2024-06-19 DIAGNOSIS — O03.9 MISCARRIAGE: Primary | ICD-10-CM

## 2024-06-19 LAB
ABO + RH BLD: NORMAL
ALBUMIN SERPL-MCNC: 4.1 G/DL (ref 3.5–5)
ALBUMIN/GLOB SERPL: 1.3 (ref 1–1.9)
ALP SERPL-CCNC: 115 U/L (ref 35–104)
ALT SERPL-CCNC: 51 U/L (ref 12–65)
ANION GAP SERPL CALC-SCNC: 12 MMOL/L (ref 9–18)
APPEARANCE UR: ABNORMAL
AST SERPL-CCNC: 45 U/L (ref 15–37)
BACTERIA URNS QL MICRO: NEGATIVE /HPF
BILIRUB SERPL-MCNC: 0.3 MG/DL (ref 0–1.2)
BILIRUB UR QL: NEGATIVE
BUN SERPL-MCNC: 13 MG/DL (ref 6–23)
CALCIUM SERPL-MCNC: 9.6 MG/DL (ref 8.8–10.2)
CASTS URNS QL MICRO: ABNORMAL /LPF
CHLORIDE SERPL-SCNC: 106 MMOL/L (ref 98–107)
CO2 SERPL-SCNC: 22 MMOL/L (ref 20–28)
COLOR UR: ABNORMAL
CREAT SERPL-MCNC: 0.83 MG/DL (ref 0.6–1.1)
EPI CELLS #/AREA URNS HPF: ABNORMAL /HPF
ERYTHROCYTE [DISTWIDTH] IN BLOOD BY AUTOMATED COUNT: 15.2 % (ref 11.9–14.6)
GLOBULIN SER CALC-MCNC: 3.2 G/DL (ref 2.3–3.5)
GLUCOSE SERPL-MCNC: 105 MG/DL (ref 70–99)
GLUCOSE UR STRIP.AUTO-MCNC: NEGATIVE MG/DL
HCG SERPL QL: POSITIVE
HCG SERPL-ACNC: NORMAL MIU/ML
HCT VFR BLD AUTO: 36.8 % (ref 35.8–46.3)
HGB BLD-MCNC: 12 G/DL (ref 11.7–15.4)
HGB UR QL STRIP: ABNORMAL
HYALINE CASTS URNS QL MICRO: ABNORMAL /LPF
KETONES UR QL STRIP.AUTO: ABNORMAL MG/DL
LEUKOCYTE ESTERASE UR QL STRIP.AUTO: NEGATIVE
MCH RBC QN AUTO: 26.1 PG (ref 26.1–32.9)
MCHC RBC AUTO-ENTMCNC: 32.6 G/DL (ref 31.4–35)
MCV RBC AUTO: 80 FL (ref 82–102)
NITRITE UR QL STRIP.AUTO: NEGATIVE
NRBC # BLD: 0 K/UL (ref 0–0.2)
PH UR STRIP: 6.5 (ref 5–9)
PLATELET # BLD AUTO: 167 K/UL (ref 150–450)
PMV BLD AUTO: 10.1 FL (ref 9.4–12.3)
POTASSIUM SERPL-SCNC: 4.1 MMOL/L (ref 3.5–5.1)
PROT SERPL-MCNC: 7.2 G/DL (ref 6.3–8.2)
PROT UR STRIP-MCNC: ABNORMAL MG/DL
RBC # BLD AUTO: 4.6 M/UL (ref 4.05–5.2)
RBC #/AREA URNS HPF: ABNORMAL /HPF
SODIUM SERPL-SCNC: 140 MMOL/L (ref 136–145)
SP GR UR REFRACTOMETRY: 1.03 (ref 1–1.02)
UROBILINOGEN UR QL STRIP.AUTO: 0.2 EU/DL (ref 0.2–1)
WBC # BLD AUTO: 7.5 K/UL (ref 4.3–11.1)
WBC URNS QL MICRO: ABNORMAL /HPF

## 2024-06-19 PROCEDURE — 86901 BLOOD TYPING SEROLOGIC RH(D): CPT

## 2024-06-19 PROCEDURE — 6370000000 HC RX 637 (ALT 250 FOR IP): Performed by: PHYSICIAN ASSISTANT

## 2024-06-19 PROCEDURE — 84702 CHORIONIC GONADOTROPIN TEST: CPT

## 2024-06-19 PROCEDURE — 85027 COMPLETE CBC AUTOMATED: CPT

## 2024-06-19 PROCEDURE — 84703 CHORIONIC GONADOTROPIN ASSAY: CPT

## 2024-06-19 PROCEDURE — 86900 BLOOD TYPING SEROLOGIC ABO: CPT

## 2024-06-19 PROCEDURE — 80053 COMPREHEN METABOLIC PANEL: CPT

## 2024-06-19 PROCEDURE — 76801 OB US < 14 WKS SINGLE FETUS: CPT

## 2024-06-19 PROCEDURE — 81001 URINALYSIS AUTO W/SCOPE: CPT

## 2024-06-19 PROCEDURE — 99284 EMERGENCY DEPT VISIT MOD MDM: CPT

## 2024-06-19 PROCEDURE — 2580000003 HC RX 258: Performed by: PHYSICIAN ASSISTANT

## 2024-06-19 RX ORDER — OXYCODONE HYDROCHLORIDE 5 MG/1
5 TABLET ORAL
Status: COMPLETED | OUTPATIENT
Start: 2024-06-19 | End: 2024-06-19

## 2024-06-19 RX ORDER — 0.9 % SODIUM CHLORIDE 0.9 %
500 INTRAVENOUS SOLUTION INTRAVENOUS
Status: COMPLETED | OUTPATIENT
Start: 2024-06-19 | End: 2024-06-19

## 2024-06-19 RX ORDER — ACETAMINOPHEN 500 MG
1000 TABLET ORAL
Status: COMPLETED | OUTPATIENT
Start: 2024-06-19 | End: 2024-06-19

## 2024-06-19 RX ADMIN — SODIUM CHLORIDE 500 ML: 9 INJECTION, SOLUTION INTRAVENOUS at 17:10

## 2024-06-19 RX ADMIN — OXYCODONE HYDROCHLORIDE 5 MG: 5 TABLET ORAL at 16:56

## 2024-06-19 RX ADMIN — ACETAMINOPHEN 1000 MG: 500 TABLET, FILM COATED ORAL at 13:13

## 2024-06-19 ASSESSMENT — PAIN - FUNCTIONAL ASSESSMENT: PAIN_FUNCTIONAL_ASSESSMENT: 0-10

## 2024-06-19 ASSESSMENT — PAIN DESCRIPTION - ORIENTATION: ORIENTATION: LOWER

## 2024-06-19 ASSESSMENT — LIFESTYLE VARIABLES
HOW MANY STANDARD DRINKS CONTAINING ALCOHOL DO YOU HAVE ON A TYPICAL DAY: PATIENT DOES NOT DRINK
HOW OFTEN DO YOU HAVE A DRINK CONTAINING ALCOHOL: NEVER

## 2024-06-19 ASSESSMENT — PAIN SCALES - GENERAL
PAINLEVEL_OUTOF10: 8
PAINLEVEL_OUTOF10: 8

## 2024-06-19 ASSESSMENT — PAIN DESCRIPTION - DESCRIPTORS: DESCRIPTORS: STABBING

## 2024-06-19 ASSESSMENT — PAIN DESCRIPTION - LOCATION: LOCATION: ABDOMEN

## 2024-06-19 NOTE — ED TRIAGE NOTES
Pt c/o heavy vaginal bleeding on 06/05. Pt was 9 weeks pregnant and has continued to bleed with clotting. Denies nausea, vomiting or fever. Reports painful cramping. Pt states she is bleeding through four tampons/pads a day.

## 2024-06-19 NOTE — DISCHARGE INSTRUCTIONS
As we discussed your ultrasound today did not a fetal heart rate and your symptoms likely represent a miscarrigae.  You still have retained products of conception.  It is very important that you schedule follow-up with OB for definitive management, called Dr. Abad's office to get scheduled to be seen as soon as possible either this week or early next week.    As we discussed if symptoms worsen in any way you need to return to the emergency department, this is including but not limited to fevers, vomiting, lightheadedness or dizziness, shortness of breath, increased pain, increased bleeding.  As we discussed our OB is onsite at [facility] and it would be recommended that you be evaluated there in the event that symptoms worsened in any way however we are happy to see you at any of the [place] [facility].     Follow-up with your PCP in 1 to 2 days if no improvement.  Return to the ER for any new or worsening symptoms.

## 2024-06-19 NOTE — ED NOTES
Patient mobility status  with no difficulty. Provider aware     I have reviewed discharge instructions with the patient.  The patient verbalized understanding.    Patient left ED via Discharge Method: ambulatory to Home with Significant Other.    Opportunity for questions and clarification provided.     Patient given 0 scripts.            Juany Virk RN  06/19/24 2000

## 2024-06-19 NOTE — ED PROVIDER NOTES
Emergency Department Provider Note       PCP: No primary care provider on file.   Age: 28 y.o.   Sex: female     DISPOSITION Decision To Discharge 06/19/2024 05:52:48 PM       ICD-10-CM    1. Miscarriage  O03.9           Medical Decision Making     27 yo female presenting with vaginal bleeding and concern for miscarriage. Based on LNMP should be approximately 12.5 weeks, has had bleeding for 2 weeks. Not established with OBGYN locally. Vitals are stable, pelvic exam performed with RN chaperone. Small amount of vaginal bleeding, no clots or products of conception on exam. US shows  \"A gestational sac with a fetal pole is identified with a crown-rump length of 9.3 cm corresponding to a  gestational age of 7 weeks 0 days. Likely small subchorionic hemorrhage. No  fetal heart motion identified.\" Consistent with fetal demise. Discussed with OB hospitalist who states patient is ok to be discharged and follow up outpatient for d/c. I have given her strict return precautions and follow up with next unassigned OB. Patient in agreement with plan.  ED Course as of 06/19/24 2014 Wed Jun 19, 2024   1235 ABO Rh: O POSITIVE  No indication for rhogam   [KE]   1511 US TRANSABDOMINAL TRANSVAGINAL LESS THAN 14 WEEKS [KE]   1638 I discussed the case with OB hospitalist, Dr. Tmaez.  States patient is okay to be discharged with outpatient follow-up but will likely need a D&C outpatient scheduled.  She is okay to follow-up with the next unassigned OB.  At this is Dr. Elena Abad.  Strict return precautions regarding signs of infection to be given to patient.  Discussed plan with patient and she is in agreement with this plan [KE]      ED Course User Index  [KE] Isbaelle Bridges PA     1 acute, uncomplicated illness or injury.  Shared medical decision making was utilized in creating the patients health plan today.    I independently ordered and reviewed each unique test.       I interpreted the Ultrasound  I reviewed imaging and

## 2024-07-25 ENCOUNTER — APPOINTMENT (OUTPATIENT)
Dept: GENERAL RADIOLOGY | Age: 29
End: 2024-07-25

## 2024-07-25 ENCOUNTER — HOSPITAL ENCOUNTER (EMERGENCY)
Age: 29
Discharge: HOME | End: 2024-07-25

## 2024-07-25 VITALS
HEIGHT: 64 IN | DIASTOLIC BLOOD PRESSURE: 70 MMHG | WEIGHT: 110 LBS | OXYGEN SATURATION: 100 % | BODY MASS INDEX: 18.78 KG/M2 | SYSTOLIC BLOOD PRESSURE: 116 MMHG | HEART RATE: 93 BPM | RESPIRATION RATE: 16 BRPM | TEMPERATURE: 98.1 F

## 2024-07-25 DIAGNOSIS — M25.532 LEFT WRIST PAIN: Primary | ICD-10-CM

## 2024-07-25 PROCEDURE — 6370000000 HC RX 637 (ALT 250 FOR IP)

## 2024-07-25 PROCEDURE — 73130 X-RAY EXAM OF HAND: CPT

## 2024-07-25 PROCEDURE — 99283 EMERGENCY DEPT VISIT LOW MDM: CPT

## 2024-07-25 PROCEDURE — 73110 X-RAY EXAM OF WRIST: CPT

## 2024-07-25 RX ORDER — IBUPROFEN 800 MG/1
800 TABLET ORAL
Status: COMPLETED | OUTPATIENT
Start: 2024-07-25 | End: 2024-07-25

## 2024-07-25 RX ADMIN — IBUPROFEN 800 MG: 800 TABLET, FILM COATED ORAL at 23:06

## 2024-07-26 NOTE — ED NOTES
Patient mobility status  with no difficulty. Provider aware     I have reviewed discharge instructions with the patient.  The patient verbalized understanding.    Patient left ED via Discharge Method: ambulatory to Home with  self .    Opportunity for questions and clarification provided.     Patient given 0 scripts.            Ana Heart LPN  07/25/24 [id number]

## 2024-07-26 NOTE — ED PROVIDER NOTES
soft  tissue swelling, nonspecific. Consider follow-up radiographs if pain persists.      Electronically signed by Mylene Garner   XR HAND LEFT (MIN 3 VIEWS)    Narrative    Left Wrist, left hand    INDICATION: pain swelling    COMPARISON:  None    TECHNIQUE: Three views of the left wrist were obtained. 3 views of the left hand    FINDINGS: There is no evidence of fracture or dislocation. No bony erosions or  lesions are seen. The distal radius and carpal bones are intact.    Minimal soft tissue swelling, nonspecific.      Impression    No acute displaced fracture of the left hand or wrist. Minimal soft  tissue swelling, nonspecific. Consider follow-up radiographs if pain persists.      Electronically signed by Mylene Garner         XR WRIST LEFT (MIN 3 VIEWS)   Final Result   No acute displaced fracture of the left hand or wrist. Minimal soft   tissue swelling, nonspecific. Consider follow-up radiographs if pain persists.         Electronically signed by Mylene Garner      XR HAND LEFT (MIN 3 VIEWS)   Final Result   No acute displaced fracture of the left hand or wrist. Minimal soft   tissue swelling, nonspecific. Consider follow-up radiographs if pain persists.         Electronically signed by Mylene Garner                   No results for input(s): \"COVID19\" in the last 72 hours.    Voice dictation software was used during the making of this note.  This software is not perfect and grammatical and other typographical errors may be present.  This note has not been completely proofread for errors.        Stella Duarte, APRN - CNP  07/25/24 1611

## 2024-07-26 NOTE — DISCHARGE INSTRUCTIONS
Use Tylenol ibuprofen as needed for pain.  Ice on 20 minutes off 20 minutes.  Follow-up with orthopedics as listed on this document if you continue to have pain past 1 week so they can repeat x-rays.  Wear wrist brace while you are having pain.

## 2024-07-28 ENCOUNTER — HOSPITAL ENCOUNTER (EMERGENCY)
Age: 29
Discharge: HOME | End: 2024-07-28
Attending: EMERGENCY MEDICINE

## 2024-07-28 VITALS
HEART RATE: 91 BPM | BODY MASS INDEX: 19.81 KG/M2 | RESPIRATION RATE: 18 BRPM | DIASTOLIC BLOOD PRESSURE: 69 MMHG | OXYGEN SATURATION: 99 % | TEMPERATURE: 98.2 F | WEIGHT: 116 LBS | SYSTOLIC BLOOD PRESSURE: 94 MMHG | HEIGHT: 64 IN

## 2024-07-28 DIAGNOSIS — F31.9 BIPOLAR AFFECTIVE DISORDER, REMISSION STATUS UNSPECIFIED (HCC): Primary | ICD-10-CM

## 2024-07-28 PROCEDURE — 99283 EMERGENCY DEPT VISIT LOW MDM: CPT

## 2024-07-28 RX ORDER — DIVALPROEX SODIUM 250 MG/1
250 TABLET, EXTENDED RELEASE ORAL DAILY
Qty: 30 TABLET | Refills: 3 | Status: SHIPPED | OUTPATIENT
Start: 2024-07-28

## 2024-07-28 ASSESSMENT — PAIN - FUNCTIONAL ASSESSMENT: PAIN_FUNCTIONAL_ASSESSMENT: NONE - DENIES PAIN

## 2024-07-28 ASSESSMENT — PAIN SCALES - GENERAL: PAINLEVEL_OUTOF10: 0

## 2024-07-28 NOTE — ED PROVIDER NOTES
Emergency Department Provider Note       PCP: No primary care provider on file.   Age: 28 y.o.   Sex: female     DISPOSITION Decision To Discharge 07/28/2024 03:20:28 PM       ICD-10-CM    1. Bipolar affective disorder, remission status unspecified (HCC)  F31.9           Medical Decision Making     Patient clinically appears well.  Speaking easily in full sentences.  No signs and symptoms of acute manic episode at this point.  Affect is reassuring.  I feel restarting some of her medications which have helped her in the past is prudent.  Do not feel further evaluation emergency department is necessary.  Will provide any handouts for local mental health follow-up to the patient that is available here in the emergency department to her.  Return precautions provided.     1 chronic illness with exacerbation.  Prescription drug management performed.    I independently ordered and reviewed each unique test.                     History     HPI  Patient here reporting longstanding history of bipolar disorder.  Denies any suicidal ideation.  No homicidal ideation.  States she is having increasing symptoms of her bipolar including increasing depression and episodes of anger.  She does not desire to act on them however she feels she needs to be back on her medicines.  She has been off her medications since January stating that she thought she could handle it.  Has been on numerous medications previously.  She states the medicine that helped for the most albeit did not completely control her symptoms was Depakote.  She states when she was pregnant she was on Seroquel.  She denies any illicit substance abuse.  She is currently working.  She is future oriented.  Physical Exam     Vitals signs and nursing note reviewed:  Vitals:    07/28/24 1444   BP: 104/74   Pulse: 95   Resp: 18   Temp: 98.2 °F (36.8 °C)   TempSrc: Oral   SpO2: 97%   Weight: 52.6 kg (116 lb)   Height: 1.626 m (5' 4\")      Physical Exam  Vitals and nursing note

## 2024-07-28 NOTE — ED NOTES
I have reviewed discharge instructions with the patient.  The patient verbalized understanding.    Patient left ED via Discharge Method: ambulatory to Home with self.    Opportunity for questions and clarification provided.       Patient given 0 scripts.         To continue your aftercare when you leave the hospital, you may receive an automated call from our care team to check in on how you are doing.  This is a free service and part of our promise to provide the best care and service to meet your aftercare needs.” If you have questions, or wish to unsubscribe from this service please call [phone].  Thank you for Choosing our [facility] Emergency Department.        Hanh Davis LPN  07/28/24 8140

## 2024-07-28 NOTE — ED TRIAGE NOTES
Pt to ED ambulatory to triage with c/o being out of her bipolar disorder medications since January due to not being able to financially afford them. Pt was seeing Holley Segura in [place] at the time. Pt does no have a provider down here. Pt at the time was on Seroquel but had stopped her Depakote due to being pregnant. Pt states feelings of anger and sadness and needing help getting her prescriptions filled. No HI/SI at this time.

## 2025-04-28 ENCOUNTER — VBI (OUTPATIENT)
Dept: ADMINISTRATIVE | Facility: OTHER | Age: 30
End: 2025-04-28

## 2025-04-28 NOTE — TELEPHONE ENCOUNTER
04/28/25 8:52 AM     Chart reviewed for Pap Smear (HPV) aka Cervical Cancer Screening was/were not submitted to the patient's insurance.     Reba Ludwig MA   [facility]